# Patient Record
Sex: FEMALE | Race: WHITE | NOT HISPANIC OR LATINO | Employment: OTHER | URBAN - METROPOLITAN AREA
[De-identification: names, ages, dates, MRNs, and addresses within clinical notes are randomized per-mention and may not be internally consistent; named-entity substitution may affect disease eponyms.]

---

## 2021-06-16 ENCOUNTER — TRANSFERRED RECORDS (OUTPATIENT)
Dept: HEALTH INFORMATION MANAGEMENT | Facility: CLINIC | Age: 81
End: 2021-06-16

## 2021-06-17 ENCOUNTER — TRANSFERRED RECORDS (OUTPATIENT)
Dept: HEALTH INFORMATION MANAGEMENT | Facility: CLINIC | Age: 81
End: 2021-06-17

## 2021-06-18 ENCOUNTER — TRANSFERRED RECORDS (OUTPATIENT)
Dept: HEALTH INFORMATION MANAGEMENT | Facility: CLINIC | Age: 81
End: 2021-06-18

## 2021-06-22 ENCOUNTER — MEDICAL CORRESPONDENCE (OUTPATIENT)
Dept: HEALTH INFORMATION MANAGEMENT | Facility: CLINIC | Age: 81
End: 2021-06-22

## 2021-06-25 ENCOUNTER — TRANSFERRED RECORDS (OUTPATIENT)
Dept: HEALTH INFORMATION MANAGEMENT | Facility: CLINIC | Age: 81
End: 2021-06-25

## 2021-06-28 ENCOUNTER — TRANSFERRED RECORDS (OUTPATIENT)
Dept: HEALTH INFORMATION MANAGEMENT | Facility: CLINIC | Age: 81
End: 2021-06-28

## 2021-07-08 ENCOUNTER — TRANSFERRED RECORDS (OUTPATIENT)
Dept: HEALTH INFORMATION MANAGEMENT | Facility: CLINIC | Age: 81
End: 2021-07-08

## 2021-07-14 ENCOUNTER — TRANSFERRED RECORDS (OUTPATIENT)
Dept: HEALTH INFORMATION MANAGEMENT | Facility: CLINIC | Age: 81
End: 2021-07-14

## 2021-07-21 ENCOUNTER — TRANSFERRED RECORDS (OUTPATIENT)
Dept: HEALTH INFORMATION MANAGEMENT | Facility: CLINIC | Age: 81
End: 2021-07-21

## 2021-07-22 ENCOUNTER — TRANSFERRED RECORDS (OUTPATIENT)
Dept: HEALTH INFORMATION MANAGEMENT | Facility: CLINIC | Age: 81
End: 2021-07-22

## 2021-07-26 ENCOUNTER — TRANSCRIBE ORDERS (OUTPATIENT)
Dept: OTHER | Age: 81
End: 2021-07-26

## 2021-07-26 DIAGNOSIS — C34.90 LUNG CANCER (H): Primary | ICD-10-CM

## 2021-07-27 ENCOUNTER — PATIENT OUTREACH (OUTPATIENT)
Dept: ONCOLOGY | Facility: CLINIC | Age: 81
End: 2021-07-27

## 2021-07-27 NOTE — PROGRESS NOTES
I spoke to daughter Danna about mother's referral. She has a biopsy scheduled this Thursday of her lungs. They would like to be seen as soon as possible.    Naida Jeronimo RN

## 2021-07-29 ENCOUNTER — DOCUMENTATION ONLY (OUTPATIENT)
Dept: OTHER | Age: 81
End: 2021-07-29

## 2021-07-29 ENCOUNTER — TRANSFERRED RECORDS (OUTPATIENT)
Dept: HEALTH INFORMATION MANAGEMENT | Facility: CLINIC | Age: 81
End: 2021-07-29
Payer: COMMERCIAL

## 2021-07-29 NOTE — TELEPHONE ENCOUNTER
RECORDS STATUS - ALL OTHER DIAGNOSIS      RECORDS RECEIVED FROM: [facility]   DATE RECEIVED:    NOTES STATUS DETAILS   OFFICE NOTE from referring provider [facility]/Ree 21   OFFICE NOTE from medical oncologist     DISCHARGE SUMMARY from hospital     DISCHARGE REPORT from the ER     OPERATIVE REPORT Epic/Ree Pt has Bx shceduled  @ 9:30 - requested .    21: Colonoscopy  21, 21: EGD   MEDICATION LIST     CLINICAL TRIAL TREATMENTS TO DATE     LABS     PATHOLOGY REPORTS [facility], Report in , Slides received  * 21: Requested  - [employer] Slides RECEIVED 21: [id number]  21: [id number]   ANYTHING RELATED TO DIAGNOSIS [facility] 21   GENONOMIC TESTING     TYPE:     IMAGING (NEED IMAGES & REPORT)     XR PACS 21, 21: Ree/Report in [facility]   CT SCANS PACS 21, 21: Ree/Report in [facility]   MRI     MAMMO PACS 6/10/21: Ree/Report in Epic   ULTRASOUND PACS 21: Ree/Report in [facility]   PET PACS 21: Ree/Report in Epic

## 2021-08-02 ENCOUNTER — ONCOLOGY VISIT (OUTPATIENT)
Dept: ONCOLOGY | Facility: CLINIC | Age: 81
End: 2021-08-02
Attending: STUDENT IN AN ORGANIZED HEALTH CARE EDUCATION/TRAINING PROGRAM
Payer: COMMERCIAL

## 2021-08-02 ENCOUNTER — PRE VISIT (OUTPATIENT)
Dept: ONCOLOGY | Facility: CLINIC | Age: 81
End: 2021-08-02

## 2021-08-02 VITALS
BODY MASS INDEX: 31.95 KG/M2 | RESPIRATION RATE: 16 BRPM | HEART RATE: 77 BPM | HEIGHT: 63 IN | OXYGEN SATURATION: 95 % | TEMPERATURE: 97.9 F | DIASTOLIC BLOOD PRESSURE: 83 MMHG | WEIGHT: 180.3 LBS | SYSTOLIC BLOOD PRESSURE: 137 MMHG

## 2021-08-02 DIAGNOSIS — C34.90 LUNG CANCER (H): ICD-10-CM

## 2021-08-02 PROCEDURE — G0463 HOSPITAL OUTPT CLINIC VISIT: HCPCS

## 2021-08-02 PROCEDURE — 99205 OFFICE O/P NEW HI 60 MIN: CPT | Performed by: STUDENT IN AN ORGANIZED HEALTH CARE EDUCATION/TRAINING PROGRAM

## 2021-08-02 RX ORDER — SIMVASTATIN 20 MG
TABLET ORAL
COMMUNITY

## 2021-08-02 RX ORDER — VERAPAMIL HYDROCHLORIDE 240 MG/1
TABLET, FILM COATED, EXTENDED RELEASE ORAL
COMMUNITY

## 2021-08-02 RX ORDER — SPIRONOLACTONE 50 MG/1
50 TABLET, FILM COATED ORAL
COMMUNITY

## 2021-08-02 ASSESSMENT — PAIN SCALES - GENERAL: PAINLEVEL: NO PAIN (0)

## 2021-08-02 ASSESSMENT — MIFFLIN-ST. JEOR: SCORE: 1249.35

## 2021-08-02 NOTE — PROGRESS NOTES
MEDICAL ONCOLOGY INITIAL CONSULT NOTE    PATIENT NAME: Zuleima Bustamante  ENCOUNTER DATE: 8/2/2021    Care Team  Primary Oncologist: Dr. Hester, [facility]     REASON FOR CURRENT VISIT: 2nd opinion for new diagnosis of lung cancer    HISTORY OF PRESENT ILLNESS:  Ms. Zuleima Bustamante is a 81 year old  female with previous smoking history but quit 42 years ago,    Oncologic Hx:    Diagnosis: Stage (AJCC 8th edition)  Most likely stage IV lung cancer (biopsy results pending), mesothelioma vs NSCLC    Treatment:TBD    Intent of treatment: Palliative/Curative-TBD    Oncologic course:    6/16/2021-6/26/21-admitted at [place], [place] for increasing shortness of breath.  Chest x-ray showed moderate right-sided pleural effusion and airspace consolidation in the right lung base.  A CT abd/pelvis scan showed moderate sided right-sided pleural effusion and several lung nodules abutting the pleura along the medial aspect of right lung suspicious for malignancy.  The largest of the solid enhancing lesions was 2.3 x 1.5 cm.  Another lesion measured 2.4 x 1.4 cm.  There was a number of other nodular enhancing masslike foci along the right pleura including a focus measuring 4.2 x 1.1 cm.  There is also 1.5 cm solid nodule in the right middle lobe.  No other areas of metastasis identified.  Left adrenal nodule suspected.  The patient did have several clustered enhancing solid nodules in the left pelvis just medial to the inferior pubic ramus and lateral to the rectum and vagina.  Although these are nonspecific may be concerning for malignancy.  Mild right right-sided hydronephrosis and mild urothelial enhancement in the proximal right ureter.  She underwent thoracentesis on 6/8/2021 and on 1160 cc of fluid was removed.  The right lung pleural fluid cytology revealed malignant cells (no specific histology identified).  EGD on 6/17/2021 was unremarkable and colonoscopy 6/25 revealed diverticulosis and rectal  polyp, biopsy showed hyperplastic polyp.  No obvious source of bleeding was noted.  7/8/21-PET CT scan done at [facility]-a metabolically active pleural-based mass in the right lower thorax measure 6.7 x 6 x 2.8 cm with an SUV of 14.3.  Additional metabolically active noncalcified pleural nodules are seen scattered throughout the right thorax.  Right pleural effusion.  Metabolically active right paraesophageal lymphadenopathy is likely metastatic.  Liver spleen and adrenal glands are clear of metabolic activity.  A nodule in the left adrenal gland is metabolically inactive and likely benign.  7/14/21- CT head- no evidence of metastatic disease (?MRI)  7/29/21- Lung biopsy at [place]- Biopsy results not yet available    Oncologist at [place]- Dr. Hester-  Oncology nurse- [phone]- Myrna Gresham Hx:    She is here with daughter, Danna who flew in from [place], [place].  Zuleima lives in [place], [place].She lives in [place], 6 months Oct to May for the winter. She lives by her self.    She is currently asymptomatic most of the time. On further questioning she does have some SOB on walking long distances. She walked about 3 blocks today with no SOB. She can take 2 stairs without SOB. She was tired this spring but she was closing the housee in [place]. She just felt tired while doing this.   Over the last 2 weeks, no SOB. She does sleep more lately.   No chest pain, whezing, fever, chills, rigors  No abdominal pain, nausea, vomiting. Appetite is good.  No headaches or vison problems, focal neurological symptoms.    She endorses trouble opening things, she is noticing it more now. She is indepndent of ADL and IADL.  She quit smoking 42 yrs pedro, she smoked total 10 pack yr. 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    REVIEW OF SYSTEMS: 14 point ROS negative other than the symptoms noted above in the  HPI.    Wt Readings from Last 4 Encounters:   08/02/21 81.8 kg (180 lb 4.8 oz)          Review of Systems:  A comprehensive ROS was performed and found to be negative or non-contributory with the exception of that noted in the HPI above.    Past Medical History:  CKD stage III  Hypertension  Hypercholesterolemia  Type 2 diabetes mellitus  Osteoporosis      Past Surgical History:  No past surgical history on file.   Appendectomy  Partial hysterectomy  Right humerus saranya insertion  Left wrist ORIF      Social History:    2 kids, both daughter, Ruthy lives in [facility]  She lives alone and is independent  Social History     Socioeconomic History     Marital status:      Spouse name: Not on file     Number of children: Not on file     Years of education: Not on file     Highest education level: Not on file   Occupational History     Not on file   Tobacco Use     Smoking status: Not on file   Substance and Sexual Activity     Alcohol use: Not on file     Drug use: Not on file     Sexual activity: Not on file   Other Topics Concern     Not on file   Social History Narrative     Not on file     Social Determinants of Health     Financial Resource Strain:      Difficulty of Paying Living Expenses:    Food Insecurity:      Worried About Running Out of Food in the Last Year:      Ran Out of Food in the Last Year:    Transportation Needs:      Lack of Transportation (Medical):      Lack of Transportation (Non-Medical):    Physical Activity:      Days of Exercise per Week:      Minutes of Exercise per Session:    Stress:      Feeling of Stress :    Social Connections:      Frequency of Communication with Friends and Family:      Frequency of Social Gatherings with Friends and Family:      Attends Caoda[employer] Services:      Active Member of Clubs or Organizations:      Attends Club or Organization Meetings:      Marital Status:    Intimate Partner Violence:      Fear of Current or Ex-Partner:      Emotionally Abused:       "Physically Abused:      Sexually Abused:         Family History  No family history on file.  Mother had breast cancer at age 75, lived to 99, and daughter has breast cancer at age of 40, living, stage 4.  She has a sister who is age 70 who does not have any malignancy.  Pateral Uncle - Lung cancer, smoker  Aunt Thalia paternal- Lung cancer, no msoking  Maternal aunt- colon cancer  Aunts daugter- had breast cancer      Outpatient Medications:  metFORMIN (GLUCOPHAGE) 500 MG tablet,   simvastatin (ZOCOR) 20 MG tablet,   spironolactone (ALDACTONE) 50 MG tablet, Take 50 mg by mouth  verapamil ER (CALAN-SR) 240 MG CR tablet,     No current facility-administered medications on file prior to visit.  Alendronate 70 mg 1 tablet by mouth every week  Aspirin 81 mg by mouth daily  Calcium carbonate 500 mg 1 tablet 2-3 times a day  Cholecalciferol vitamin D3 1000 international units by mouth every day  Ferrous sulfate 325 mg 1 tablet by mouth every day  Metformin 500 mg takes half a tablet 1 time daily  Uses CPAP  Naproxen 220 mg takes 2 tablets by mouth 2 times a day as needed for pain  Simvastatin 20 mg, takes half a tablet every day  Spironolactone 50 mg tablet, takes half a tablet every day  Verapamil 250 mg tablet, 1 capsule daily      Physical Exam:    Blood pressure 137/83, pulse 77, temperature 97.9  F (36.6  C), temperature source Oral, resp. rate 16, height 1.604 m (5' 3.15\"), weight 81.8 kg (180 lb 4.8 oz), SpO2 95 %.  General: alert and cooperative, lying in bed, no acute distress  HEENT: sclera anicteric, EOMI, MMM  Neck: supple, normal ROM  CV: RRR, no murmurs  Resp: CTAB, normal respiratory effort on ambient air  GI: soft, non-tender, non-distended, bowel sounds present and normoactive  MSK: warm and well-perfused, normal tone  Skin: no rashes on limited exam, no jaundice  Neuro: Alert and interactive, moves all extremities equally, no focal deficits      ASSESSMENT AND PLAN:    Ms. Zuleima Bustamante is a 81 year old  " female with previous 10 pack yr smoking Hx but quit 42 years ago,HTN. HLD, t2DM now with newly diagnosed pleural lesion, pending biopsy    # Most likely stage IV lung cancer (biopsy results pending), mesothelioma vs NSCLC    I discussed with Zuleima the results of the PET scan which showed multiple pleural plaques largest one in the right cardiophrenic angle.  Due to her history of asbestos exposure, I discussed that this could be consistent with malignant pleural mesothelioma.  Other possibilities include non-small cell lung cancer, small cell lung cancer, or other metastatic disease.  She did have a pleural effusion with cytology showing malignant cells but no histopathologic diagnosis was made outside.  She did have a biopsy procedure done in my note on Thursday, however, the results are still pending.  I discussed that the prognosis and eventually treatment plans will depend on the diagnosis.  I discussed that most likely if this is stage IV cancer, which is not curable but very treatable.  The goals of treatment will be palliative, meaning, delay further treatment progression, improve or maintain quality of life and extend survival as much as he can.  She and her daughter both understood that the prognosis will eventually depend on the cancer diagnosis.  I discussed that once we have a diagnosis, we will again regroup to go over the best treatment options.  However my highest suspicion here is of mesothelioma, and that if this is truly mesothelioma then I will discuss with thoracic surgery group to see if this is resectable.  In my opinion this might not be resectable if this is mesothelioma.  Nonetheless, I briefly discussed that if this is unresectable mesothelioma then immunotherapy with nivolumab and ipilimumab clinically appropriate.  I will follow-up and get the results of the biopsy and follow-up with the patient over my recommendations.  Most likely, the patient will get treatment up in my not but would  like guidance from here.  I also discussed about getting genetic counselling done for family hx of breast cancer. Furthermore, germline BAP1 mutation is relatively common (10-15%) patients with mesothelioma, also predisposes to breast caner as well.    Plan  --We will obtain biopsy results from [place]  --Return to clinic with me on Thursday to go over the results of biopsy and next plans.  --Genetic counseling for ?BAP 1      #Bone health: Has a history of known osteoporosis and takes alendronate every week.    # Cancer related pain: Has no symptoms of cancer related pain    #Nutrition: Doing well no weight loss    #Psychiatry: Coping well    #COVID vaccine: She has finished both shopts, End of Feb, Pfizer      On the day of service,  Preparation time: 10 minutes  Visit time: 45 minutes  Care Coordination: 10 minutes      Fernando Kimble M.D.   of Medicine  Division of Hematology, Oncology and Transplantation  [facility]

## 2021-08-02 NOTE — LETTER
8/2/2021         RE: Zuleima Bustamante  [address] [place] [facility] [address]        Dear Colleague,    Thank you for referring your patient, Zuleima Bustamante, to the [facility]. Please see a copy of my visit note below.    MEDICAL ONCOLOGY INITIAL CONSULT NOTE    PATIENT NAME: Zuleima Bustamante  ENCOUNTER DATE: 8/2/2021    Care Team  Primary Oncologist: Dr. Hester, [facility]     REASON FOR CURRENT VISIT: 2nd opinion for new diagnosis of lung cancer    HISTORY OF PRESENT ILLNESS:  Ms. Zuleima Bustamante is a 81 year old  female with previous smoking history but quit 42 years ago,    Oncologic Hx:    Diagnosis: Stage (AJCC 8th edition)  Most likely stage IV lung cancer (biopsy results pending), mesothelioma vs NSCLC    Treatment:TBD    Intent of treatment: Palliative/Curative-TBD    Oncologic course:    6/16/2021-6/26/21-admitted at [place], [place] for increasing shortness of breath.  Chest x-ray showed moderate right-sided pleural effusion and airspace consolidation in the right lung base.  A CT abd/pelvis scan showed moderate sided right-sided pleural effusion and several lung nodules abutting the pleura along the medial aspect of right lung suspicious for malignancy.  The largest of the solid enhancing lesions was 2.3 x 1.5 cm.  Another lesion measured 2.4 x 1.4 cm.  There was a number of other nodular enhancing masslike foci along the right pleura including a focus measuring 4.2 x 1.1 cm.  There is also 1.5 cm solid nodule in the right middle lobe.  No other areas of metastasis identified.  Left adrenal nodule suspected.  The patient did have several clustered enhancing solid nodules in the left pelvis just medial to the inferior pubic ramus and lateral to the rectum and vagina.  Although these are nonspecific may be concerning for malignancy.  Mild right right-sided hydronephrosis and mild urothelial enhancement in the proximal right ureter.  She underwent  thoracentesis on 6/8/2021 and on 1160 cc of fluid was removed.  The right lung pleural fluid cytology revealed malignant cells (no specific histology identified).  EGD on 6/17/2021 was unremarkable and colonoscopy 6/25 revealed diverticulosis and rectal polyp, biopsy showed hyperplastic polyp.  No obvious source of bleeding was noted.  7/8/21-PET CT scan done at [facility]-a metabolically active pleural-based mass in the right lower thorax measure 6.7 x 6 x 2.8 cm with an SUV of 14.3.  Additional metabolically active noncalcified pleural nodules are seen scattered throughout the right thorax.  Right pleural effusion.  Metabolically active right paraesophageal lymphadenopathy is likely metastatic.  Liver spleen and adrenal glands are clear of metabolic activity.  A nodule in the left adrenal gland is metabolically inactive and likely benign.  7/14/21- CT head- no evidence of metastatic disease (?MRI)  7/29/21- Lung biopsy at [place]- Biopsy results not yet available    Oncologist at [facility]- Dr. Hester-  Oncology nurse- [phone]- Myrna Gresham Hx:    She is here with daughter, Danna who flew in from [place], [place].  Zuleima lives in [place], [place].She lives in [place], 6 months Oct to May for the winter. She lives by her self.    She is currently asymptomatic most of the time. On further questioning she does have some SOB on walking long distances. She walked about 3 blocks today with no SOB. She can take 2 stairs without SOB. She was tired this spring but she was closing the housee in [place]. She just felt tired while doing this.   Over the last 2 weeks, no SOB. She does sleep more lately.   No chest pain, whezing, fever, chills, rigors  No abdominal pain, nausea, vomiting. Appetite is good.  No headaches or vison problems, focal neurological symptoms.    She endorses trouble opening things, she is noticing it more now. She is indepndent of ADL and IADL.  She quit smoking 42 yrs pedro, she smoked total 10 pack yr. 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    REVIEW OF SYSTEMS: 14 point ROS negative other than the symptoms noted above in the HPI.    Wt Readings from Last 4 Encounters:   08/02/21 81.8 kg (180 lb 4.8 oz)          Review of Systems:  A comprehensive ROS was performed and found to be negative or non-contributory with the exception of that noted in the HPI above.    Past Medical History:  CKD stage III  Hypertension  Hypercholesterolemia  Type 2 diabetes mellitus  Osteoporosis      Past Surgical History:  No past surgical history on file.   Appendectomy  Partial hysterectomy  Right humerus saranya insertion  Left wrist ORIF      Social History:    2 kids, both daughter, Ruthy lives in [facility]  She lives alone and is independent  Social History     Socioeconomic History     Marital status:      Spouse name: Not on file     Number of children: Not on file     Years of education: Not on file     Highest education level: Not on file   Occupational History     Not on file   Tobacco Use     Smoking status: Not on file   Substance and Sexual Activity     Alcohol use: Not on file     Drug use: Not on file     Sexual activity: Not on file   Other Topics Concern     Not on file   Social History Narrative     Not on file     Social Determinants of Health     Financial Resource Strain:      Difficulty of Paying Living Expenses:    Food Insecurity:      Worried About Running Out of Food in the Last Year:      Ran Out of Food in the Last Year:    Transportation Needs:      Lack of Transportation (Medical):      Lack of Transportation (Non-Medical):    Physical Activity:      Days of Exercise per Week:      Minutes of Exercise per Session:    Stress:      Feeling of Stress :    Social Connections:      Frequency of Communication with Friends and Family:      Frequency of Social Gatherings with Friends and  "Family:      Attends [employer] Services:      Active Member of Clubs or Organizations:      Attends Club or Organization Meetings:      Marital Status:    Intimate Partner Violence:      Fear of Current or Ex-Partner:      Emotionally Abused:      Physically Abused:      Sexually Abused:         Family History  No family history on file.  Mother had breast cancer at age 75, lived to 99, and daughter has breast cancer at age of 40, living, stage 4.  She has a sister who is age 70 who does not have any malignancy.  Pateral Uncle - Lung cancer, smoker  Aunt Thalia paternal- Lung cancer, no msoking  Maternal aunt- colon cancer  Aunts daugter- had breast cancer      Outpatient Medications:  metFORMIN (GLUCOPHAGE) 500 MG tablet,   simvastatin (ZOCOR) 20 MG tablet,   spironolactone (ALDACTONE) 50 MG tablet, Take 50 mg by mouth  verapamil ER (CALAN-SR) 240 MG CR tablet,     No current facility-administered medications on file prior to visit.  Alendronate 70 mg 1 tablet by mouth every week  Aspirin 81 mg by mouth daily  Calcium carbonate 500 mg 1 tablet 2-3 times a day  Cholecalciferol vitamin D3 1000 international units by mouth every day  Ferrous sulfate 325 mg 1 tablet by mouth every day  Metformin 500 mg takes half a tablet 1 time daily  Uses CPAP  Naproxen 220 mg takes 2 tablets by mouth 2 times a day as needed for pain  Simvastatin 20 mg, takes half a tablet every day  Spironolactone 50 mg tablet, takes half a tablet every day  Verapamil 250 mg tablet, 1 capsule daily      Physical Exam:    Blood pressure 137/83, pulse 77, temperature 97.9  F (36.6  C), temperature source Oral, resp. rate 16, height 1.604 m (5' 3.15\"), weight 81.8 kg (180 lb 4.8 oz), SpO2 95 %.  General: alert and cooperative, lying in bed, no acute distress  HEENT: sclera anicteric, EOMI, MMM  Neck: supple, normal ROM  CV: RRR, no murmurs  Resp: CTAB, normal respiratory effort on ambient air  GI: soft, non-tender, non-distended, bowel sounds present and " normoactive  MSK: warm and well-perfused, normal tone  Skin: no rashes on limited exam, no jaundice  Neuro: Alert and interactive, moves all extremities equally, no focal deficits      ASSESSMENT AND PLAN:    Ms. Zuleima Bustamante is a 81 year old  female with previous 10 pack yr smoking Hx but quit 42 years ago,HTN. HLD, t2DM now with newly diagnosed pleural lesion, pending biopsy    # Most likely stage IV lung cancer (biopsy results pending), mesothelioma vs NSCLC    I discussed with Zuleima the results of the PET scan which showed multiple pleural plaques largest one in the right cardiophrenic angle.  Due to her history of asbestos exposure, I discussed that this could be consistent with malignant pleural mesothelioma.  Other possibilities include non-small cell lung cancer, small cell lung cancer, or other metastatic disease.  She did have a pleural effusion with cytology showing malignant cells but no histopathologic diagnosis was made outside.  She did have a biopsy procedure done in my note on Thursday, however, the results are still pending.  I discussed that the prognosis and eventually treatment plans will depend on the diagnosis.  I discussed that most likely if this is stage IV cancer, which is not curable but very treatable.  The goals of treatment will be palliative, meaning, delay further treatment progression, improve or maintain quality of life and extend survival as much as he can.  She and her daughter both understood that the prognosis will eventually depend on the cancer diagnosis.  I discussed that once we have a diagnosis, we will again regroup to go over the best treatment options.  However my highest suspicion here is of mesothelioma, and that if this is truly mesothelioma then I will discuss with thoracic surgery group to see if this is resectable.  In my opinion this might not be resectable if this is mesothelioma.  Nonetheless, I briefly discussed that if this is unresectable mesothelioma  then immunotherapy with nivolumab and ipilimumab clinically appropriate.  I will follow-up and get the results of the biopsy and follow-up with the patient over my recommendations.  Most likely, the patient will get treatment up in my not but would like guidance from here.  I also discussed about getting genetic counselling done for family hx of breast cancer. Furthermore, germline BAP1 mutation is relatively common (10-15%) patients with mesothelioma, also predisposes to breast caner as well.    Plan  --We will obtain biopsy results from [place]  --Return to clinic with me on Thursday to go over the results of biopsy and next plans.  --Genetic counseling for ?BAP 1      #Bone health: Has a history of known osteoporosis and takes alendronate every week.    # Cancer related pain: Has no symptoms of cancer related pain    #Nutrition: Doing well no weight loss    #Psychiatry: Coping well    #COVID vaccine: She has finished both shopts, End of Feb, Pfizer      On the day of service,  Preparation time: 10 minutes  Visit time: 45 minutes  Care Coordination: 10 minutes      Fernando Kimble M.D.   of Medicine  Division of Hematology, Oncology and Transplantation  [facility]      Again, thank you for allowing me to participate in the care of your patient.        Sincerely,        Fernando Kimble MD

## 2021-08-02 NOTE — NURSING NOTE
"Oncology Rooming Note    August 2, 2021 11:51 AM   Zuleima Bustamante is a 81 year old female who presents for:    Chief Complaint   Patient presents with     Oncology Clinic Visit     Lung Cancer      Initial Vitals: /83 (BP Location: Left arm, Patient Position: Sitting, Cuff Size: Adult Large)   Pulse 77   Temp 97.9  F (36.6  C) (Oral)   Resp 16   Ht 1.604 m (5' 3.15\")   Wt 81.8 kg (180 lb 4.8 oz)   SpO2 95%   BMI 31.79 kg/m   Estimated body mass index is 31.79 kg/m  as calculated from the following:    Height as of this encounter: 1.604 m (5' 3.15\").    Weight as of this encounter: 81.8 kg (180 lb 4.8 oz). Body surface area is 1.91 meters squared.  No Pain (0) Comment: Data Unavailable   No LMP recorded. Patient is postmenopausal.  Allergies reviewed: Yes  Medications reviewed: Yes    Medications: Medication refills not needed today.  Pharmacy name entered into [facility]: [facility] [place], [place] - [id number]    Clinical concerns: Treatment plan        Phuong Saleem LPN              "

## 2021-08-04 ENCOUNTER — PATIENT OUTREACH (OUTPATIENT)
Dept: ONCOLOGY | Facility: CLINIC | Age: 81
End: 2021-08-04

## 2021-08-05 ENCOUNTER — LAB (OUTPATIENT)
Dept: LAB | Facility: CLINIC | Age: 81
End: 2021-08-05
Attending: STUDENT IN AN ORGANIZED HEALTH CARE EDUCATION/TRAINING PROGRAM
Payer: COMMERCIAL

## 2021-08-05 ENCOUNTER — VIRTUAL VISIT (OUTPATIENT)
Dept: ONCOLOGY | Facility: CLINIC | Age: 81
End: 2021-08-05
Attending: STUDENT IN AN ORGANIZED HEALTH CARE EDUCATION/TRAINING PROGRAM
Payer: MEDICARE

## 2021-08-05 DIAGNOSIS — C34.90 LUNG CANCER (H): Primary | ICD-10-CM

## 2021-08-05 DIAGNOSIS — C34.90 LUNG CANCER (H): ICD-10-CM

## 2021-08-05 LAB
PATH REPORT.COMMENTS IMP SPEC: NORMAL
PATH REPORT.FINAL DX SPEC: NORMAL
PATH REPORT.GROSS SPEC: NORMAL
PATH REPORT.MICROSCOPIC SPEC OTHER STN: NORMAL
PATH REPORT.RELEVANT HX SPEC: NORMAL
PATH REPORT.RELEVANT HX SPEC: NORMAL
PATH REPORT.SITE OF ORIGIN SPEC: NORMAL

## 2021-08-05 PROCEDURE — 99214 OFFICE O/P EST MOD 30 MIN: CPT | Mod: 95 | Performed by: STUDENT IN AN ORGANIZED HEALTH CARE EDUCATION/TRAINING PROGRAM

## 2021-08-05 PROCEDURE — 88321 CONSLTJ&REPRT SLD PREP ELSWR: CPT | Performed by: PATHOLOGY

## 2021-08-05 NOTE — PROGRESS NOTES
MEDICAL ONCOLOGY FOLLOW UP NOTE    PATIENT NAME: Zuleima Bustamante  ENCOUNTER DATE: 8/5/2021    Care Team  Primary Oncologist: Dr. Hester, [facility]     REASON FOR CURRENT VISIT: 2nd opinion for new diagnosis of lung cancer    HISTORY OF PRESENT ILLNESS:  Ms. 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  Chest x-ray showed moderate right-sided pleural effusion and airspace consolidation in the right lung base.  A CT abd/pelvis scan showed moderate sided right-sided pleural effusion and several lung nodules abutting the pleura along the medial aspect of right lung suspicious for malignancy.  The largest of the solid enhancing lesions was 2.3 x 1.5 cm.  Another lesion measured 2.4 x 1.4 cm.  There was a number of other nodular enhancing masslike foci along the right pleura including a focus measuring 4.2 x 1.1 cm.  There is also 1.5 cm solid nodule in the right middle lobe.  No other areas of metastasis identified.  Left adrenal nodule suspected.  The patient did have several clustered enhancing solid nodules in the left pelvis just medial to the inferior pubic ramus and lateral to the rectum and vagina.  Although these are nonspecific may be concerning for malignancy.  Mild right right-sided hydronephrosis and mild urothelial enhancement in the proximal right ureter.  She underwent thoracentesis on 6/8/2021 and on 1160 cc of fluid was removed.  The right lung pleural fluid cytology revealed malignant cells (no specific histology identified).  EGD on 6/17/2021 was unremarkable and colonoscopy 6/25 revealed  diverticulosis and rectal polyp, biopsy showed hyperplastic polyp.  No obvious source of bleeding was noted.  7/8/21-PET CT scan done at [facility]-a metabolically active pleural-based mass in the right lower thorax measure 6.7 x 6 x 2.8 cm with an SUV of 14.3.  Additional metabolically active noncalcified pleural nodules are seen scattered throughout the right thorax.  Right pleural effusion.  Metabolically active right paraesophageal lymphadenopathy is likely metastatic.  Liver spleen and adrenal glands are clear of metabolic activity.  A nodule in the left adrenal gland is metabolically inactive and likely benign.  7/14/21- CT head- no evidence of metastatic disease (?MRI)  7/29/21- Lung biopsy at [place]- Biopsy results (consistent with mesothelioma)    Oncologist at [place]- Dr. Hester-  Oncology nurse- [phone]- Myrna Gresham Hx:  She was on the phone call with daughter, Danna who flew in from [place], [place].  Zuleima lives in [place], [place].She lives in [place], 6 months Oct to May for the winter. She lives by her self.    Since our last appt she has noticed slightly worse GEORGES. Otherwise no new symptoms.   She endorses trouble opening things with her hands, she is noticing it more now. She is indepndent of ADL and IADL.  She quit smoking 42 yrs pedro, she smoked total 10 pack yr. 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      REVIEW OF SYSTEMS: 14 point ROS negative other than the symptoms noted above in the HPI.    Wt Readings from Last 4 Encounters:   08/02/21 81.8 kg (180 lb 4.8 oz)          Review of Systems:  A comprehensive ROS was performed and found to be negative or non-contributory with the exception of that noted in the HPI above.    Past Medical History:  CKD stage III  Hypertension  Hypercholesterolemia  Type 2 diabetes mellitus  Osteoporosis      Past Surgical History:  No  past surgical history on file.   Appendectomy  Partial hysterectomy  Right humerus saranya insertion  Left wrist ORIF      Social History:    2 kids, both daughter, Ruthy lives in [facility]  She lives alone and is independent  Social History     Socioeconomic History     Marital status:      Spouse name: Not on file     Number of children: Not on file     Years of education: Not on file     Highest education level: Not on file   Occupational History     Not on file   Tobacco Use     Smoking status: Former Smoker     Quit date:      Years since quittin.6     Smokeless tobacco: Never Used     Tobacco comment: quit in    Substance and Sexual Activity     Alcohol use: Not on file     Drug use: Not on file     Sexual activity: Not on file   Other Topics Concern     Not on file   Social History Narrative     Not on file     Social Determinants of Health     Financial Resource Strain:      Difficulty of Paying Living Expenses:    Food Insecurity:      Worried About Running Out of Food in the Last Year:      Ran Out of Food in the Last Year:    Transportation Needs:      Lack of Transportation (Medical):      Lack of Transportation (Non-Medical):    Physical Activity:      Days of Exercise per Week:      Minutes of Exercise per Session:    Stress:      Feeling of Stress :    Social Connections:      Frequency of Communication with Friends and Family:      Frequency of Social Gatherings with Friends and Family:      Attends Jewish Services:      Active Member of Clubs or Organizations:      Attends Club or Organization Meetings:      Marital Status:    Intimate Partner Violence:      Fear of Current or Ex-Partner:      Emotionally Abused:      Physically Abused:      Sexually Abused:         Family History  No family history on file.  Mother had breast cancer at age 75, lived to 99, and daughter has breast cancer at age of 40, living, stage 4.  She has a sister who is age 70 who does not have any  malignancy.  Pateral Uncle - Lung cancer, smoker  Aunt [place] paternal- Lung cancer, no msoking  Maternal aunt- colon cancer  Aunts daugter- had breast cancer      Outpatient Medications:  metFORMIN (GLUCOPHAGE) 500 MG tablet,   simvastatin (ZOCOR) 20 MG tablet,   spironolactone (ALDACTONE) 50 MG tablet, Take 50 mg by mouth  verapamil ER (CALAN-SR) 240 MG CR tablet,     No current facility-administered medications on file prior to visit.  Alendronate 70 mg 1 tablet by mouth every week  Aspirin 81 mg by mouth daily  Calcium carbonate 500 mg 1 tablet 2-3 times a day  Cholecalciferol vitamin D3 1000 international units by mouth every day  Ferrous sulfate 325 mg 1 tablet by mouth every day  Metformin 500 mg takes half a tablet 1 time daily  Uses CPAP  Naproxen 220 mg takes 2 tablets by mouth 2 times a day as needed for pain  Simvastatin 20 mg, takes half a tablet every day  Spironolactone 50 mg tablet, takes half a tablet every day  Verapamil 250 mg tablet, 1 capsule daily      Physical Exam:    Not able to perform due to telephone visit      ASSESSMENT AND PLAN:    Ms. Zuleima Bustamante is a 81 year old  female with previous 10 pack yr smoking Hx but quit 42 years ago,HTN. 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  #Significant history of asbestos exposure    I was able to see the biopsy results and also discussed it with the pathologist Dr. Parmer at [place].  The results are consistent with mesothelioma.  Most likely epithelioid.  I discussed the results with the patient over the phone.  I then also discussed her case with Dr. Roberson who is one of the thoracic surgeons here.  On review of her versus PET scan.  Per Dr. Roberson, this might be potentially resectable.  She will therefore contact the patient in the next few days to arrange for an appointment to discuss surgical resection is an  "option.  I discussed that another option could be just to watch her very closely with a short-term CT scan in neck 6 weeks to assess the rate of cancer progression. If she refuses surgery for some reason is not able to get surgery, I discussed that immunotherapy would still remain as an option if she wants to start treatment right away.  I also discussed about getting genetic counselling done for family hx of breast cancer. Furthermore, germline BAP1 mutation is relatively common (10-15%) patients with mesothelioma, also predisposes to breast caner as well.    Plan  --Dr. Roberson's team will contact her to evaluate for potential surgical resection  --Genetic counseling for ?BAP 1, later  --Return to clinic with me (8/11)      #Bone health: Has a history of known osteoporosis and takes alendronate every week.    # Cancer related pain: Has no symptoms of cancer related pain    #Nutrition: Doing well no weight loss    #Psychiatry: Coping well    #COVID vaccine: She has finished both [employer], End of Feb, Pfizer      On the day of service,  Preparation time: 5 minutes  Visit time: 20 minutes  Care Coordination: 5 minutes      Fernando Kimble M.D.   of Medicine  Division of Hematology, Oncology and Transplantation  [facility]    Zuleima is a 81 year old who is being evaluated via a billable telephone visit.      What phone number would you like to be contacted at? [phone]  How would you like to obtain your AVS? Mail a copy     I have reviewed and updated the patient's allergies and medication list.    Concerns: none  Refills: none     Vitals - Patient Reported  Weight (Patient Reported): 81.6 kg (180 lb)  Height (Patient Reported): 160 cm (5' 3\")  BMI (Based on Pt Reported Ht/Wt): 31.89  Pain Score: No Pain (0)    Corin Nicole CMA        Phone call duration: 20 minutes  "

## 2021-08-05 NOTE — PROGRESS NOTES
TC to pt to discuss plans for next appt with Dr. Kimble as path results are pending. Writer spoke with pathologist at pt's local hospital lab who stated results should be in by 08/05 in the a.m. Plan made with pt to keep appt with Dr. Kimble as a telephone visit as she will be driving back to [place] starting in the morning. Dr. Kimble provided with an update and contact info for local pathologist, Dr. Mathew, [phone].

## 2021-08-05 NOTE — LETTER
8/5/2021         RE: Zuleima Bustamante  [address] [facility] [address]        Dear Colleague,    Thank you for referring your patient, Zuleima Bustamante, to the [facility]. Please see a copy of my visit note below.    MEDICAL ONCOLOGY FOLLOW UP NOTE    PATIENT NAME: Zuleima Bustamante  ENCOUNTER DATE: 8/5/2021    Care Team  Primary Oncologist: Dr. Hester, [facility]     REASON FOR CURRENT VISIT: 2nd opinion for new diagnosis of lung cancer    HISTORY OF PRESENT ILLNESS:  Ms. 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  Chest x-ray showed moderate right-sided pleural effusion and airspace consolidation in the right lung base.  A CT abd/pelvis scan showed moderate sided right-sided pleural effusion and several lung nodules abutting the pleura along the medial aspect of right lung suspicious for malignancy.  The largest of the solid enhancing lesions was 2.3 x 1.5 cm.  Another lesion measured 2.4 x 1.4 cm.  There was a number of other nodular enhancing masslike foci along the right pleura including a focus measuring 4.2 x 1.1 cm.  There is also 1.5 cm solid nodule in the right middle lobe.  No other areas of metastasis identified.  Left adrenal nodule suspected.  The patient did have several clustered enhancing solid nodules in the left pelvis just medial to the inferior pubic ramus and lateral to the rectum and vagina.  Although these are nonspecific may be concerning for malignancy.  Mild right right-sided hydronephrosis and mild urothelial enhancement in the proximal right ureter.   She underwent thoracentesis on 6/8/2021 and on 1160 cc of fluid was removed.  The right lung pleural fluid cytology revealed malignant cells (no specific histology identified).  EGD on 6/17/2021 was unremarkable and colonoscopy 6/25 revealed diverticulosis and rectal polyp, biopsy showed hyperplastic polyp.  No obvious source of bleeding was noted.  7/8/21-PET CT scan done at [facility]-a metabolically active pleural-based mass in the right lower thorax measure 6.7 x 6 x 2.8 cm with an SUV of 14.3.  Additional metabolically active noncalcified pleural nodules are seen scattered throughout the right thorax.  Right pleural effusion.  Metabolically active right paraesophageal lymphadenopathy is likely metastatic.  Liver spleen and adrenal glands are clear of metabolic activity.  A nodule in the left adrenal gland is metabolically inactive and likely benign.  7/14/21- CT head- no evidence of metastatic disease (?MRI)  7/29/21- Lung biopsy at [place]- Biopsy results (consistent with mesothelioma)    Oncologist at [place]- Dr. Hester-  Oncology nurse- [phone]- Myrna Gresham Hx:  She was on the phone call with daughter, Danna who flew in from [place], [place].  Zuleima lives in [place], [place].She lives in [place], 6 months Oct to May for the winter. She lives by her self.    Since our last appt she has noticed slightly worse GEORGES. Otherwise no new symptoms.   She endorses trouble opening things with her hands, she is noticing it more now. She is indepndent of ADL and IADL.  She quit smoking 42 yrs pedro, she smoked total 10 pack yr. 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      REVIEW OF SYSTEMS: 14 point ROS negative other than the symptoms noted above in the HPI.    Wt Readings from Last 4 Encounters:   08/02/21 81.8 kg (180 lb 4.8 oz)          Review of Systems:  A comprehensive ROS was performed and  found to be negative or non-contributory with the exception of that noted in the HPI above.    Past Medical History:  CKD stage III  Hypertension  Hypercholesterolemia  Type 2 diabetes mellitus  Osteoporosis      Past Surgical History:  No past surgical history on file.   Appendectomy  Partial hysterectomy  Right humerus saranya insertion  Left wrist ORIF      Social History:    2 kids, both daughter, Ruthy lives in [facility]  She lives alone and is independent  Social History     Socioeconomic History     Marital status:      Spouse name: Not on file     Number of children: Not on file     Years of education: Not on file     Highest education level: Not on file   Occupational History     Not on file   Tobacco Use     Smoking status: Former Smoker     Quit date:      Years since quittin.6     Smokeless tobacco: Never Used     Tobacco comment: quit in    Substance and Sexual Activity     Alcohol use: Not on file     Drug use: Not on file     Sexual activity: Not on file   Other Topics Concern     Not on file   Social History Narrative     Not on file     Social Determinants of Health     Financial Resource Strain:      Difficulty of Paying Living Expenses:    Food Insecurity:      Worried About Running Out of Food in the Last Year:      Ran Out of Food in the Last Year:    Transportation Needs:      Lack of Transportation (Medical):      Lack of Transportation (Non-Medical):    Physical Activity:      Days of Exercise per Week:      Minutes of Exercise per Session:    Stress:      Feeling of Stress :    Social Connections:      Frequency of Communication with Friends and Family:      Frequency of Social Gatherings with Friends and Family:      Attends Jehovah's witness Services:      Active Member of Clubs or Organizations:      Attends Club or Organization Meetings:      Marital Status:    Intimate Partner Violence:      Fear of Current or Ex-Partner:      Emotionally Abused:      Physically Abused:       Sexually Abused:         Family History  No family history on file.  Mother had breast cancer at age 75, lived to 99, and daughter has breast cancer at age of 40, living, stage 4.  She has a sister who is age 70 who does not have any malignancy.  Pateral Uncle - Lung cancer, smoker  Aunt [place] paternal- Lung cancer, no msoking  Maternal aunt- colon cancer  Aunts daugter- had breast cancer      Outpatient Medications:  metFORMIN (GLUCOPHAGE) 500 MG tablet,   simvastatin (ZOCOR) 20 MG tablet,   spironolactone (ALDACTONE) 50 MG tablet, Take 50 mg by mouth  verapamil ER (CALAN-SR) 240 MG CR tablet,     No current facility-administered medications on file prior to visit.  Alendronate 70 mg 1 tablet by mouth every week  Aspirin 81 mg by mouth daily  Calcium carbonate 500 mg 1 tablet 2-3 times a day  Cholecalciferol vitamin D3 1000 international units by mouth every day  Ferrous sulfate 325 mg 1 tablet by mouth every day  Metformin 500 mg takes half a tablet 1 time daily  Uses CPAP  Naproxen 220 mg takes 2 tablets by mouth 2 times a day as needed for pain  Simvastatin 20 mg, takes half a tablet every day  Spironolactone 50 mg tablet, takes half a tablet every day  Verapamil 250 mg tablet, 1 capsule daily      Physical Exam:    Not able to perform due to telephone visit      ASSESSMENT AND PLAN:    Ms. Zuleima Bustamante is a 81 year old  female with previous 10 pack yr smoking Hx but quit 42 years ago,HTN. 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  #Significant history of asbestos exposure    I was able to see the biopsy results and also discussed it with the pathologist Dr. Parmer at [place].  The results are consistent with mesothelioma.  Most likely epithelioid.  I discussed the results with the patient over the phone.  I then also discussed her case with Dr. Roberson who is one of the thoracic  "surgeons here.  On review of her versus PET scan.  Per Dr. Roberson, this might be potentially resectable.  She will therefore contact the patient in the next few days to arrange for an appointment to discuss surgical resection is an option.  I discussed that another option could be just to watch her very closely with a short-term CT scan in neck 6 weeks to assess the rate of cancer progression. If she refuses surgery for some reason is not able to get surgery, I discussed that immunotherapy would still remain as an option if she wants to start treatment right away.  I also discussed about getting genetic counselling done for family hx of breast cancer. Furthermore, germline BAP1 mutation is relatively common (10-15%) patients with mesothelioma, also predisposes to breast caner as well.    Plan  --Dr. Roberson's team will contact her to evaluate for potential surgical resection  --Genetic counseling for ?BAP 1, later  --Return to clinic with me (8/11)      #Bone health: Has a history of known osteoporosis and takes alendronate every week.    # Cancer related pain: Has no symptoms of cancer related pain    #Nutrition: Doing well no weight loss    #Psychiatry: Coping well    #COVID vaccine: She has finished both [employer], End of Feb, Pfizer      On the day of service,  Preparation time: 5 minutes  Visit time: 20 minutes  Care Coordination: 5 minutes      Fernando Kimble M.D.   of Medicine  Division of Hematology, Oncology and Transplantation  [facility]    Zuleima is a 81 year old who is being evaluated via a billable telephone visit.      What phone number would you like to be contacted at? [phone]  How would you like to obtain your AVS? Mail a copy     I have reviewed and updated the patient's allergies and medication list.    Concerns: none  Refills: none     Vitals - Patient Reported  Weight (Patient Reported): 81.6 kg (180 lb)  Height (Patient Reported): 160 cm (5' 3\")  BMI (Based on Pt " Reported Ht/Wt): 31.89  Pain Score: No Pain (0)    Corin Nicole CMA        Phone call duration: 20 minutes      Again, thank you for allowing me to participate in the care of your patient.        Sincerely,        Fernando Kimble MD

## 2021-08-06 DIAGNOSIS — C45.9 MESOTHELIOMA (H): Primary | ICD-10-CM

## 2021-08-09 ENCOUNTER — APPOINTMENT (OUTPATIENT)
Dept: LAB | Facility: CLINIC | Age: 81
End: 2021-08-09
Payer: MEDICARE

## 2021-08-09 ENCOUNTER — PATIENT OUTREACH (OUTPATIENT)
Dept: CARE COORDINATION | Facility: CLINIC | Age: 81
End: 2021-08-09

## 2021-08-09 ENCOUNTER — LAB (OUTPATIENT)
Dept: LAB | Facility: CLINIC | Age: 81
End: 2021-08-09
Payer: MEDICARE

## 2021-08-09 DIAGNOSIS — C34.90 LUNG CANCER (H): Primary | ICD-10-CM

## 2021-08-09 LAB
PATH REPORT.COMMENTS IMP SPEC: NORMAL
PATH REPORT.FINAL DX SPEC: NORMAL
PATH REPORT.FINAL DX SPEC: NORMAL
PATH REPORT.GROSS SPEC: NORMAL
PATH REPORT.GROSS SPEC: NORMAL
PATH REPORT.MICROSCOPIC SPEC OTHER STN: NORMAL
PATH REPORT.MICROSCOPIC SPEC OTHER STN: NORMAL
PATH REPORT.RELEVANT HX SPEC: NORMAL
PATH REPORT.SITE OF ORIGIN SPEC: NORMAL
PATH REPORT.SITE OF ORIGIN SPEC: NORMAL

## 2021-08-09 PROCEDURE — 88321 CONSLTJ&REPRT SLD PREP ELSWR: CPT | Performed by: PATHOLOGY

## 2021-08-09 PROCEDURE — 88321 CONSLTJ&REPRT SLD PREP ELSWR: CPT | Mod: XE | Performed by: PATHOLOGY

## 2021-08-10 NOTE — PROGRESS NOTES
Social Work Follow-Up  [facility]    Data/Intervention:  Patient Name:  Zuleima Bustamante  /Age:  1940 (81 year old)    Reason for Follow-Up:  Accommodations/Lodging    Collaborated With:    -Patient    Intervention/Education/Resources Provided:  SW received a phone call from patient who stated that they had been transferred from the Accommodations office. Patient stated that they are interested in receiving information on hotels/motels near the [facility] as they have an upcoming appointment next week. Per patient's request, SW emailed patient a list of hotels/motels near the [facility] as well as lodging within the [place] area.    Assessment/Plan:  SW will remain available as needed.  Previously provided patient/family with writer's contact information and availability.       LESTER Storey,LGSW  Hematology/Oncology Social Worker  Phone:[phone] Pager: [phone]

## 2021-08-11 ENCOUNTER — VIRTUAL VISIT (OUTPATIENT)
Dept: ONCOLOGY | Facility: CLINIC | Age: 81
End: 2021-08-11
Attending: STUDENT IN AN ORGANIZED HEALTH CARE EDUCATION/TRAINING PROGRAM
Payer: COMMERCIAL

## 2021-08-11 DIAGNOSIS — C34.90 LUNG CANCER (H): ICD-10-CM

## 2021-08-11 DIAGNOSIS — C45.0 MESOTHELIOMA (PLEURAL) (H): Primary | ICD-10-CM

## 2021-08-11 PROCEDURE — 99213 OFFICE O/P EST LOW 20 MIN: CPT | Mod: 95 | Performed by: STUDENT IN AN ORGANIZED HEALTH CARE EDUCATION/TRAINING PROGRAM

## 2021-08-11 NOTE — LETTER
8/11/2021         RE: Zuleima Bustamante  [address] [facility] [address]        Dear Colleague,    Thank you for referring your patient, Zuleima Bustamante, to the [facility]. Please see a copy of my visit note below.    MEDICAL ONCOLOGY FOLLOW UP NOTE    PATIENT NAME: Zuleima Bustamante  ENCOUNTER DATE: 8/11/2021    Care Team  Primary Oncologist: Dr. Hester, [facility]     REASON FOR CURRENT VISIT: 2nd opinion for new diagnosis of lung cancer    HISTORY OF PRESENT ILLNESS:  Ms. 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  Chest x-ray showed moderate right-sided pleural effusion and airspace consolidation in the right lung base.  A CT abd/pelvis scan showed moderate sided right-sided pleural effusion and several lung nodules abutting the pleura along the medial aspect of right lung suspicious for malignancy.  The largest of the solid enhancing lesions was 2.3 x 1.5 cm.  Another lesion measured 2.4 x 1.4 cm.  There was a number of other nodular enhancing masslike foci along the right pleura including a focus measuring 4.2 x 1.1 cm.  There is also 1.5 cm solid nodule in the right middle lobe.  No other areas of metastasis identified.  Left adrenal nodule suspected.  The patient did have several clustered enhancing solid nodules in the left pelvis just medial to the inferior pubic ramus and lateral to the rectum and vagina.  Although these are nonspecific may be concerning for malignancy.  Mild right right-sided hydronephrosis and mild urothelial  enhancement in the proximal right ureter.  She underwent thoracentesis on 6/8/2021 and on 1160 cc of fluid was removed.  The right lung pleural fluid cytology revealed malignant cells (no specific histology identified).  EGD on 6/17/2021 was unremarkable and colonoscopy 6/25 revealed diverticulosis and rectal polyp, biopsy showed hyperplastic polyp.  No obvious source of bleeding was noted.  7/8/21-PET CT scan done at [facility]-a metabolically active pleural-based mass in the right lower thorax measure 6.7 x 6 x 2.8 cm with an SUV of 14.3.  Additional metabolically active noncalcified pleural nodules are seen scattered throughout the right thorax.  Right pleural effusion.  Metabolically active right paraesophageal lymphadenopathy is likely metastatic.  Liver spleen and adrenal glands are clear of metabolic activity.  A nodule in the left adrenal gland is metabolically inactive and likely benign.  7/14/21- CT head- no evidence of metastatic disease   7/29/21- Lung biopsy at [place]- Biopsy results (consistent with mesothelioma, epitheloid histology)    Oncologist at [place]- Dr. Hester-  Oncology nurse- [phone]- Myrna Gresham Hx:  She was on the phone call with daughter, Danna who flew in from [place], [place].  Zuleima lives in [place], [place].She lives in [place], 6 months Oct to May for the winter. She lives by her self.    Since our last appt she has stable SOB, and slight pressure in the chest when she wears bra.  Otherwise no new symptoms. She thinks she is getting her strength back.She endorses trouble opening things with her hands, she is noticing it more now. She is indepndent of ADL and IADL.  She quit smoking 42 yrs pedro, she smoked total 10 pack yr. 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    REVIEW OF SYSTEMS: 14 point ROS negative other than the symptoms noted above in the HPI.    Wt  Readings from Last 4 Encounters:   21 81.8 kg (180 lb 4.8 oz)          Review of Systems:  A comprehensive ROS was performed and found to be negative or non-contributory with the exception of that noted in the HPI above.    Past Medical History:  CKD stage III  Hypertension  Hypercholesterolemia  Type 2 diabetes mellitus  Osteoporosis      Past Surgical History:  No past surgical history on file.   Appendectomy  Partial hysterectomy  Right humerus saranya insertion  Left wrist ORIF      Social History:    2 kids, both daughter, Ruthy lives in [facility]  She lives alone and is independent  Social History     Socioeconomic History     Marital status:      Spouse name: Not on file     Number of children: Not on file     Years of education: Not on file     Highest education level: Not on file   Occupational History     Not on file   Tobacco Use     Smoking status: Former Smoker     Quit date:      Years since quittin.6     Smokeless tobacco: Never Used     Tobacco comment: quit in    Substance and Sexual Activity     Alcohol use: Not on file     Drug use: Not on file     Sexual activity: Not on file   Other Topics Concern     Not on file   Social History Narrative     Not on file     Social Determinants of Health     Financial Resource Strain:      Difficulty of Paying Living Expenses:    Food Insecurity:      Worried About Running Out of Food in the Last Year:      Ran Out of Food in the Last Year:    Transportation Needs:      Lack of Transportation (Medical):      Lack of Transportation (Non-Medical):    Physical Activity:      Days of Exercise per Week:      Minutes of Exercise per Session:    Stress:      Feeling of Stress :    Social Connections:      Frequency of Communication with Friends and Family:      Frequency of Social Gatherings with Friends and Family:      Attends [employer] Services:      Active Member of Clubs or Organizations:      Attends Club or Organization Meetings:       Marital Status:    Intimate Partner Violence:      Fear of Current or Ex-Partner:      Emotionally Abused:      Physically Abused:      Sexually Abused:         Family History  No family history on file.  Mother had breast cancer at age 75, lived to 99, and daughter has breast cancer at age of 40, living, stage 4.  She has a sister who is age 70 who does not have any malignancy.  Pateral Uncle - Lung cancer, smoker  Aunt [place] paternal- Lung cancer, no msoking  Maternal aunt- colon cancer  Aunts daugter- had breast cancer      Outpatient Medications:  metFORMIN (GLUCOPHAGE) 500 MG tablet,   simvastatin (ZOCOR) 20 MG tablet,   spironolactone (ALDACTONE) 50 MG tablet, Take 50 mg by mouth  verapamil ER (CALAN-SR) 240 MG CR tablet,     No current facility-administered medications on file prior to visit.  Alendronate 70 mg 1 tablet by mouth every week  Aspirin 81 mg by mouth daily  Calcium carbonate 500 mg 1 tablet 2-3 times a day  Cholecalciferol vitamin D3 1000 international units by mouth every day  Ferrous sulfate 325 mg 1 tablet by mouth every day  Metformin 500 mg takes half a tablet 1 time daily  Uses CPAP  Naproxen 220 mg takes 2 tablets by mouth 2 times a day as needed for pain  Simvastatin 20 mg, takes half a tablet every day  Spironolactone 50 mg tablet, takes half a tablet every day  Verapamil 250 mg tablet, 1 capsule daily      Physical Exam:    Not able to perform due to telephone visit      ASSESSMENT AND PLAN:    Ms. Zuleima Bustamante is a 81 year old  female with previous 10 pack yr smoking Hx but quit 42 years ago,HTN. 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 Roberson.  She thinks that the lesions are potentially  resectable.  She has an appointment with Dr. Roberson to discuss surgical treatment options.  I discussed that her treatment options might include either EPP which is a more radical surgery or pleurectomy decortication which is a lung sparing surgery.  I briefly discussed the data behind this, which are mostly nonrandomized phase 2 studies.  I also briefly discussed the role of neoadjuvant or adjuvant chemotherapy and radiation in this setting.  I discussed that again the data is in the form of nonrandomized phase 2 studies and there is no clear benefit established of trimodality treatment.  I discussed that if and when she gets the surgery, we can then discuss about potential adjuvant treatment options with chemotherapy and radiation at the time.  She is in agreement with the plan.  She then asked several intelligent questions which were answered to her satisfaction.      Plan  --RTC in 2 weeks  --Genetic counseling for ?BAP 1, later      #Bone health: Has a history of known osteoporosis and takes alendronate every week.    # Cancer related pain: Has no symptoms of cancer related pain    #Nutrition: Doing well no weight loss    #Psychiatry: Coping well    #COVID vaccine: She has finished both [employer], End of Feb, Pfizer      On the day of service,  Preparation time: 5 minutes  Visit time: 15 minutes  Care Coordination: 5 minutes      Fernando Kimble M.D.   of Medicine  Division of Hematology, Oncology and Transplantation  [facility]      Zuleima is a 81 year old who is being evaluated via a billable telephone visit.      What phone number would you like to be contacted at? [phone]  How would you like to obtain your AVS? TARUN Barrera    Phone call duration: 15 minutes          Again, thank you for allowing me to participate in the care of your patient.        Sincerely,        Fernando Kimble MD

## 2021-08-11 NOTE — PROGRESS NOTES
MEDICAL ONCOLOGY FOLLOW UP NOTE    PATIENT NAME: Zuleima Bustamante  ENCOUNTER DATE: 8/11/2021    Care Team  Primary Oncologist: Dr. Hester, [facility]     REASON FOR CURRENT VISIT: 2nd opinion for new diagnosis of lung cancer    HISTORY OF PRESENT ILLNESS:  Ms. 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  Chest x-ray showed moderate right-sided pleural effusion and airspace consolidation in the right lung base.  A CT abd/pelvis scan showed moderate sided right-sided pleural effusion and several lung nodules abutting the pleura along the medial aspect of right lung suspicious for malignancy.  The largest of the solid enhancing lesions was 2.3 x 1.5 cm.  Another lesion measured 2.4 x 1.4 cm.  There was a number of other nodular enhancing masslike foci along the right pleura including a focus measuring 4.2 x 1.1 cm.  There is also 1.5 cm solid nodule in the right middle lobe.  No other areas of metastasis identified.  Left adrenal nodule suspected.  The patient did have several clustered enhancing solid nodules in the left pelvis just medial to the inferior pubic ramus and lateral to the rectum and vagina.  Although these are nonspecific may be concerning for malignancy.  Mild right right-sided hydronephrosis and mild urothelial enhancement in the proximal right ureter.  She underwent thoracentesis on 6/8/2021 and on 1160 cc of fluid was removed.  The right lung pleural fluid cytology revealed malignant cells (no specific histology identified).  EGD on 6/17/2021 was  unremarkable and colonoscopy 6/25 revealed diverticulosis and rectal polyp, biopsy showed hyperplastic polyp.  No obvious source of bleeding was noted.  7/8/21-PET CT scan done at [facility]-a metabolically active pleural-based mass in the right lower thorax measure 6.7 x 6 x 2.8 cm with an SUV of 14.3.  Additional metabolically active noncalcified pleural nodules are seen scattered throughout the right thorax.  Right pleural effusion.  Metabolically active right paraesophageal lymphadenopathy is likely metastatic.  Liver spleen and adrenal glands are clear of metabolic activity.  A nodule in the left adrenal gland is metabolically inactive and likely benign.  7/14/21- CT head- no evidence of metastatic disease   7/29/21- Lung biopsy at [place]- Biopsy results (consistent with mesothelioma, epitheloid histology)    Oncologist at [place]- Dr. Hester-  Oncology nurse- [phone]- Myrna Gresham Hx:  She was on the phone call with daughter, Danna who flew in from [place], [place].  Zuleima lives in [place], [place].She lives in [place], 6 months Oct to May for the winter. She lives by her self.    Since our last appt she has stable SOB, and slight pressure in the chest when she wears bra.  Otherwise no new symptoms. She thinks she is getting her strength back.She endorses trouble opening things with her hands, she is noticing it more now. She is indepndent of ADL and IADL.  She quit smoking 42 yrs pedro, she smoked total 10 pack yr. 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    REVIEW OF SYSTEMS: 14 point ROS negative other than the symptoms noted above in the HPI.    Wt Readings from Last 4 Encounters:   08/02/21 81.8 kg (180 lb 4.8 oz)          Review of Systems:  A comprehensive ROS was performed and found to be negative or non-contributory with the exception of that noted in the HPI above.    Past  Medical History:  CKD stage III  Hypertension  Hypercholesterolemia  Type 2 diabetes mellitus  Osteoporosis      Past Surgical History:  No past surgical history on file.   Appendectomy  Partial hysterectomy  Right humerus saranya insertion  Left wrist ORIF      Social History:    2 kids, both daughter, Ruthy lives in [facility]  She lives alone and is independent  Social History     Socioeconomic History     Marital status:      Spouse name: Not on file     Number of children: Not on file     Years of education: Not on file     Highest education level: Not on file   Occupational History     Not on file   Tobacco Use     Smoking status: Former Smoker     Quit date:      Years since quittin.6     Smokeless tobacco: Never Used     Tobacco comment: quit in    Substance and Sexual Activity     Alcohol use: Not on file     Drug use: Not on file     Sexual activity: Not on file   Other Topics Concern     Not on file   Social History Narrative     Not on file     Social Determinants of Health     Financial Resource Strain:      Difficulty of Paying Living Expenses:    Food Insecurity:      Worried About Running Out of Food in the Last Year:      Ran Out of Food in the Last Year:    Transportation Needs:      Lack of Transportation (Medical):      Lack of Transportation (Non-Medical):    Physical Activity:      Days of Exercise per Week:      Minutes of Exercise per Session:    Stress:      Feeling of Stress :    Social Connections:      Frequency of Communication with Friends and Family:      Frequency of Social Gatherings with Friends and Family:      Attends [employer] Services:      Active Member of Clubs or Organizations:      Attends Club or Organization Meetings:      Marital Status:    Intimate Partner Violence:      Fear of Current or Ex-Partner:      Emotionally Abused:      Physically Abused:      Sexually Abused:         Family History  No family history on file.  Mother had breast cancer at age  75, lived to 99, and daughter has breast cancer at age of 40, living, stage 4.  She has a sister who is age 70 who does not have any malignancy.  Pateral Uncle - Lung cancer, smoker  Aunt [place] paternal- Lung cancer, no msoking  Maternal aunt- colon cancer  Aunts daugter- had breast cancer      Outpatient Medications:  metFORMIN (GLUCOPHAGE) 500 MG tablet,   simvastatin (ZOCOR) 20 MG tablet,   spironolactone (ALDACTONE) 50 MG tablet, Take 50 mg by mouth  verapamil ER (CALAN-SR) 240 MG CR tablet,     No current facility-administered medications on file prior to visit.  Alendronate 70 mg 1 tablet by mouth every week  Aspirin 81 mg by mouth daily  Calcium carbonate 500 mg 1 tablet 2-3 times a day  Cholecalciferol vitamin D3 1000 international units by mouth every day  Ferrous sulfate 325 mg 1 tablet by mouth every day  Metformin 500 mg takes half a tablet 1 time daily  Uses CPAP  Naproxen 220 mg takes 2 tablets by mouth 2 times a day as needed for pain  Simvastatin 20 mg, takes half a tablet every day  Spironolactone 50 mg tablet, takes half a tablet every day  Verapamil 250 mg tablet, 1 capsule daily      Physical Exam:    Not able to perform due to telephone visit      ASSESSMENT AND PLAN:    Ms. Zuleima Bustamante is a 81 year old  female with previous 10 pack yr smoking Hx but quit 42 years ago,HTN. 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 Roberson.  She thinks that the lesions are potentially resectable.  She has an appointment with Dr. Roberson to discuss surgical treatment options.  I discussed that her treatment options might include either EPP which is a more radical surgery or pleurectomy decortication which is a lung sparing  surgery.  I briefly discussed the data behind this, which are mostly nonrandomized phase 2 studies.  I also briefly discussed the role of neoadjuvant or adjuvant chemotherapy and radiation in this setting.  I discussed that again the data is in the form of nonrandomized phase 2 studies and there is no clear benefit established of trimodality treatment.  I discussed that if and when she gets the surgery, we can then discuss about potential adjuvant treatment options with chemotherapy and radiation at the time.  She is in agreement with the plan.  She then asked several intelligent questions which were answered to her satisfaction.      Plan  --RTC in 2 weeks  --Genetic counseling for ?BAP 1, later      #Bone health: Has a history of known osteoporosis and takes alendronate every week.    # Cancer related pain: Has no symptoms of cancer related pain    #Nutrition: Doing well no weight loss    #Psychiatry: Coping well    #COVID vaccine: She has finished both shopts, End of Feb, Pfizer      On the day of service,  Preparation time: 5 minutes  Visit time: 15 minutes  Care Coordination: 5 minutes      Fernando Kimble M.D.   of Medicine  Division of Hematology, Oncology and Transplantation  [facility]      Zuleima is a 81 year old who is being evaluated via a billable telephone visit.      What phone number would you like to be contacted at? [phone]  How would you like to obtain your AVS? TARUN Barrera    Phone call duration: 15 minutes

## 2021-08-17 ENCOUNTER — TELEPHONE (OUTPATIENT)
Dept: SURGERY | Facility: CLINIC | Age: 81
End: 2021-08-17

## 2021-08-17 DIAGNOSIS — C45.9 MESOTHELIOMA (H): Primary | ICD-10-CM

## 2021-08-17 NOTE — TELEPHONE ENCOUNTER
Left [facility] for Pt to call back and schedule PFT before 8/19 visit with Dr. Roberson if at all possible via IB request

## 2021-08-18 NOTE — PROGRESS NOTES
THORACIC SURGERY - NEW PATIENT OFFICE VISIT      Dear Dr. Enamorado,    I saw Robby at Dr. Kimble's request in consultation for the evaluation and treatment of Malignant Pleural Mesothelioma.     HPI  Mrs. Zuleima Bustamante is a 81 year old woman with recently diagnosed mesothelioma in June 2021 after she presented to the ED in [place] for increasing shortness of breath on exertion. Initially believed to be age related and activity related, her symptoms worsened noticeably after walking up a one flight of stairs in June. CT Abd/pelvis at that time demonstrated pleural effusion and several lung nodules abutting the medial right lung pleura. Core biopsies were taken on 7/29 with results consistent with Epithelioid Mesothelioma.     Presently patient has been well. She does note stable shortness of breath when exerting herself and some chest pain when wearing clothes. But otherwise is going about her daily life unimpeded. She's able to go on 30-40 min walks and is eating appropriately.  Last thoracentesis was on 8/16 (second one)                               Previsit Tests   6/16/21 CT Abd/Pelvis:   Right pleural lesions: 2.3x1.5cm, 2.4x1.4cm, 4.2x1.1cm  Right Middle Lobe lesion: 1.5cm  Left adrenal nodule, left pelvis nodules.       7/8/21 PET CT: Right lower thorax mass, 6.7 x 6 x 2.8 cm with an SUV of 14.3 with scattered smaller noncalcified pleural nodules hroughout the right thorax. Paraesophageal lymphadenopathy that is metabolically active     7/14/21: CT Head: No acute findings  7/29/21: Lung Biopsy at [place], [place] - Mesothelioma, epithelioid histology  8/19/21: Pulmonary Function Testing: FVC 2.5 103%, FEV1: 90%, DLCO: 13.65, 75%    PMH  Anemia  CKD stage III  Hypertension  Hypercholesterolemia  Type 2 diabetes mellitus  Osteoporosis       PSH  Appendectomy  Partial hysterectomy  Right humerus saranya insertion  Left wrist ORIF    PFamH  Mother - Breast cancer  Daughter - Breast cancer  Pateral Uncle - Lung  "cancer, smoker  Paternal Aunt - Lung cancer, non-smoker  Maternal Aunt - Colon Cancer    PSochH  ETOH:  TOB: Former Smoker, 10 pack year history. Quit 42 years ago.   Asbestos: Significant exposure in childhood, had \"tunnels of astbestos\" she walked through frequently in her Teens.     Allergies: \"Suture material\"    Physical examination  BMI 31.7    From a personal perspective, patient lives alone in [place], [place] though spends 6 months of the year in [place] (Oct-May). She has 2 daughters. One lives in [place], [place] and another that lives in [place], [place]      IMPRESSION   81 year old year-old female with right malignant pleural mesothelioma      PLAN  I spent 45 min on the date of the encounter in chart review, patient visit, review of tests, documentation and/or discussion with other providers about the issues documented above. I reviewed the plan as follows:  We discussed the role of multimodality treatment with mesothelioma. Her disease does seem limited to the right hemithorax.However, I would like furtehr clarification on the pelvic and adrenal nodularity.  We discussed what an extended pleurectomy/decortication would involve. I spoke about the risks and benefits including the potential need for long term rehab if her recovery wasn't smooth.    She is concerned about her age and having a major surgery. While I do share the concern about there age, she seems quite fit and I would be willing to cautiously proceed.  We will do the following and rediscuss    - MRI abdo pelvis  -Nutrititon labs  -PET overread  - New PET scan after MRI review      All questions were answered and Zuleima Bustamante and present family were in agreement with the plan.  I appreciate the opportunity to participate in the care of your patient and will keep you updated.  Sincerely,              "

## 2021-08-19 ENCOUNTER — OFFICE VISIT (OUTPATIENT)
Dept: SURGERY | Facility: CLINIC | Age: 81
End: 2021-08-19
Attending: CLINICAL NURSE SPECIALIST
Payer: MEDICARE

## 2021-08-19 VITALS
RESPIRATION RATE: 16 BRPM | HEART RATE: 69 BPM | TEMPERATURE: 98.4 F | BODY MASS INDEX: 31.95 KG/M2 | DIASTOLIC BLOOD PRESSURE: 76 MMHG | WEIGHT: 181.2 LBS | SYSTOLIC BLOOD PRESSURE: 119 MMHG

## 2021-08-19 DIAGNOSIS — C45.9 MESOTHELIOMA (H): ICD-10-CM

## 2021-08-19 PROCEDURE — 94375 RESPIRATORY FLOW VOLUME LOOP: CPT | Performed by: INTERNAL MEDICINE

## 2021-08-19 PROCEDURE — 94726 PLETHYSMOGRAPHY LUNG VOLUMES: CPT | Performed by: INTERNAL MEDICINE

## 2021-08-19 PROCEDURE — 94729 DIFFUSING CAPACITY: CPT | Performed by: INTERNAL MEDICINE

## 2021-08-19 PROCEDURE — G0463 HOSPITAL OUTPT CLINIC VISIT: HCPCS

## 2021-08-19 PROCEDURE — 99204 OFFICE O/P NEW MOD 45 MIN: CPT | Performed by: STUDENT IN AN ORGANIZED HEALTH CARE EDUCATION/TRAINING PROGRAM

## 2021-08-19 RX ORDER — ACETAMINOPHEN 500 MG
500-1000 TABLET ORAL EVERY 6 HOURS PRN
COMMUNITY

## 2021-08-19 ASSESSMENT — PAIN SCALES - GENERAL: PAINLEVEL: NO PAIN (0)

## 2021-08-19 NOTE — LETTER
8/19/2021         RE: Zuleima Bustamante  [address] [facility] [place] [address]        Dear Colleague,    Thank you for referring your patient, Zuleima Bustamante, to the [facility]. Please see a copy of my visit note below.    THORACIC SURGERY - NEW PATIENT OFFICE VISIT      Dear Dr. Enamorado,    I saw Robby at Dr. Kimble's request in consultation for the evaluation and treatment of Malignant Pleural Mesothelioma.     HPI  Mrs. Zuleima Bustamante is a 81 year old woman with recently diagnosed mesothelioma in June 2021 after she presented to the ED in [place] for increasing shortness of breath on exertion. Initially believed to be age related and activity related, her symptoms worsened noticeably after walking up a one flight of stairs in June. CT Abd/pelvis at that time demonstrated pleural effusion and several lung nodules abutting the medial right lung pleura. Core biopsies were taken on 7/29 with results consistent with Epithelioid Mesothelioma.     Presently patient has been well. She does note stable shortness of breath when exerting herself and some chest pain when wearing clothes. But otherwise is going about her daily life unimpeded. She's able to go on 30-40 min walks and is eating appropriately.  Last thoracentesis was on 8/16 (second one)                               Previsit Tests   6/16/21 CT Abd/Pelvis:   Right pleural lesions: 2.3x1.5cm, 2.4x1.4cm, 4.2x1.1cm  Right Middle Lobe lesion: 1.5cm  Left adrenal nodule, left pelvis nodules.       7/8/21 PET CT: Right lower thorax mass, 6.7 x 6 x 2.8 cm with an SUV of 14.3 with scattered smaller noncalcified pleural nodules hroughout the right thorax. Paraesophageal lymphadenopathy that is metabolically active     7/14/21: CT Head: No acute findings  7/29/21: Lung Biopsy at [place], [place] - Mesothelioma, epithelioid histology  8/19/21: Pulmonary Function Testing: FVC 2.5 103%, FEV1: 90%, DLCO: 13.65, 75%    PMH  Anemia  CKD stage  "III  Hypertension  Hypercholesterolemia  Type 2 diabetes mellitus  Osteoporosis       PSH  Appendectomy  Partial hysterectomy  Right humerus saranya insertion  Left wrist ORIF    PFamH  Mother - Breast cancer  Daughter - Breast cancer  Pateral Uncle - Lung cancer, smoker  Paternal Aunt - Lung cancer, non-smoker  Maternal Aunt - Colon Cancer    PSochH  ETOH:  TOB: Former Smoker, 10 pack year history. Quit 42 years ago.   Asbestos: Significant exposure in childhood, had \"tunnels of astbestos\" she walked through frequently in her Teens.     Allergies: \"Suture material\"    Physical examination  BMI 31.7    From a personal perspective, patient lives alone in [place], [place] though spends 6 months of the year in [place] (Oct-May). She has 2 daughters. One lives in [place], [place] and another that lives in [place], [place]      IMPRESSION   81 year old year-old female with right malignant pleural mesothelioma      PLAN  I spent 45 min on the date of the encounter in chart review, patient visit, review of tests, documentation and/or discussion with other providers about the issues documented above. I reviewed the plan as follows:  We discussed the role of multimodality treatment with mesothelioma. Her disease does seem limited to the right hemithorax.However, I would like furtehr clarification on the pelvic and adrenal nodularity.  We discussed what an extended pleurectomy/decortication would involve. I spoke about the risks and benefits including the potential need for long term rehab if her recovery wasn't smooth.    She is concerned about her age and having a major surgery. While I do share the concern about there age, she seems quite fit and I would be willing to cautiously proceed.  We will do the following and rediscuss    - MRI abdo pelvis  -Nutrititon labs  -PET overread  - New PET scan after MRI review      All questions were answered and Zuleima Bustamante and present family were in agreement with the plan.  I appreciate " the opportunity to participate in the care of your patient and will keep you updated.  Sincerely,                  Again, thank you for allowing me to participate in the care of your patient.        Sincerely,        Camille Roberson MD

## 2021-08-19 NOTE — NURSING NOTE
"Oncology Rooming Note    August 19, 2021 9:19 AM   Zuleima Bustamante is a 81 year old female who presents for:    Chief Complaint   Patient presents with     Oncology Clinic Visit     Mesothelioma      Initial Vitals: /76 (BP Location: Left arm, Patient Position: Sitting, Cuff Size: Adult Regular)   Pulse 69   Temp 98.4  F (36.9  C) (Tympanic)   Resp 16   Wt 82.2 kg (181 lb 3.2 oz)   BMI 31.95 kg/m   Estimated body mass index is 31.95 kg/m  as calculated from the following:    Height as of 8/2/21: 1.604 m (5' 3.15\").    Weight as of this encounter: 82.2 kg (181 lb 3.2 oz). Body surface area is 1.91 meters squared.  No Pain (0) Comment: Data Unavailable   No LMP recorded. Patient is postmenopausal.  Allergies reviewed: Yes  Medications reviewed: Yes    Medications: Medication refills not needed today.  Pharmacy name entered into [employer]:    [place], [place] - [address] [place] [address] [phone]  [id number] - [place], [place] - [address]     Clinical concerns: New concerns: patient had fluid removed recently. PFT completed today. SOB with activity.       Elaina Bertrand LPN August 19, 2021 9:19 AM              "

## 2021-08-20 DIAGNOSIS — R93.5 ABNORMAL ABDOMINAL CT SCAN: ICD-10-CM

## 2021-08-20 DIAGNOSIS — C45.9 MESOTHELIOMA (H): Primary | ICD-10-CM

## 2021-08-22 ENCOUNTER — HEALTH MAINTENANCE LETTER (OUTPATIENT)
Age: 81
End: 2021-08-22

## 2021-08-23 LAB
DLCOUNC-%PRED-PRE: 75 %
DLCOUNC-PRE: 13.65 ML/MIN/MMHG
DLCOUNC-PRED: 18.14 ML/MIN/MMHG
ERV-%PRED-PRE: 90 %
ERV-PRE: 0.24 L
ERV-PRED: 0.27 L
EXPTIME-PRE: 8.72 SEC
FEF2575-%PRED-PRE: 60 %
FEF2575-PRE: 0.9 L/SEC
FEF2575-PRED: 1.47 L/SEC
FEFMAX-%PRED-PRE: 109 %
FEFMAX-PRE: 4.9 L/SEC
FEFMAX-PRED: 4.47 L/SEC
FEV1-%PRED-PRE: 90 %
FEV1-PRE: 1.65 L
FEV1FEV6-PRE: 67 %
FEV1FEV6-PRED: 77 %
FEV1FVC-PRE: 66 %
FEV1FVC-PRED: 77 %
FEV1SVC-PRE: 65 %
FEV1SVC-PRED: 68 %
FIFMAX-PRE: 4.17 L/SEC
FRCPLETH-%PRED-PRE: 96 %
FRCPLETH-PRE: 2.58 L
FRCPLETH-PRED: 2.67 L
FVC-%PRED-PRE: 103 %
FVC-PRE: 2.5 L
FVC-PRED: 2.41 L
IC-%PRED-PRE: 94 %
IC-PRE: 2.29 L
IC-PRED: 2.43 L
RVPLETH-%PRED-PRE: 105 %
RVPLETH-PRE: 2.34 L
RVPLETH-PRED: 2.22 L
TLCPLETH-%PRED-PRE: 101 %
TLCPLETH-PRE: 4.88 L
TLCPLETH-PRED: 4.8 L
VA-%PRED-PRE: 85 %
VA-PRE: 3.78 L
VC-%PRED-PRE: 93 %
VC-PRE: 2.53 L
VC-PRED: 2.7 L

## 2021-08-24 NOTE — PROGRESS NOTES
MEDICAL ONCOLOGY FOLLOW UP NOTE    PATIENT NAME: Zuleima Bustamante  ENCOUNTER DATE: 8/25/2021    Care Team  Primary Oncologist: Dr. Hester, [facility]     REASON FOR CURRENT VISIT: 2nd opinion for new diagnosis of lung cancer    HISTORY OF PRESENT ILLNESS:  Ms. 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  Chest x-ray showed moderate right-sided pleural effusion and airspace consolidation in the right lung base.  A CT abd/pelvis scan showed moderate sided right-sided pleural effusion and several lung nodules abutting the pleura along the medial aspect of right lung suspicious for malignancy.  The largest of the solid enhancing lesions was 2.3 x 1.5 cm.  Another lesion measured 2.4 x 1.4 cm.  There was a number of other nodular enhancing masslike foci along the right pleura including a focus measuring 4.2 x 1.1 cm.  There is also 1.5 cm solid nodule in the right middle lobe.  No other areas of metastasis identified.  Left adrenal nodule suspected.  The patient did have several clustered enhancing solid nodules in the left pelvis just medial to the inferior pubic ramus and lateral to the rectum and vagina.  Although these are nonspecific may be concerning for malignancy.  Mild right right-sided hydronephrosis and mild urothelial enhancement in the proximal right ureter.  She underwent thoracentesis on 6/8/2021 and on 1160 cc of fluid was removed.  The right lung pleural fluid cytology revealed malignant cells (no specific histology identified).  EGD on 6/17/2021 was  unremarkable and colonoscopy 6/25 revealed diverticulosis and rectal polyp, biopsy showed hyperplastic polyp.  No obvious source of bleeding was noted.  7/8/21-PET CT scan done at [facility]-a metabolically active pleural-based mass in the right lower thorax measure 6.7 x 6 x 2.8 cm with an SUV of 14.3.  Additional metabolically active noncalcified pleural nodules are seen scattered throughout the right thorax.  Right pleural effusion.  Metabolically active right paraesophageal lymphadenopathy is likely metastatic.  Liver spleen and adrenal glands are clear of metabolic activity.  A nodule in the left adrenal gland is metabolically inactive and likely benign.  7/14/21- CT head- no evidence of metastatic disease   7/29/21- Lung biopsy at [facility]- Biopsy results (consistent with mesothelioma, epitheloid histology)  8/19/21: Pulmonary Function Testing: FVC 2.5 103%, FEV1: 90%, DLCO: 13.65, 75%  8/19/21- Dr. Roberson- Thoracic Surgery- Considered for Ex. PD, pending MRI of abd [facility]    Oncologist at [facility]- Dr. Hester-  Oncology nurse- [phone]- Myrna    Interval Hx:  Zuleima Bustamante is seen today as telephone visit for follow-up of mesothelioma. She was on the phone with daughter, Danna who flew in from [place], [place]. They met with Dr. Roberson and discussed surgical options. She has decided not to pursue surgery given her age and is interested to learn more about immunotherapy that was discussed previously. She plans to stay with [facility] in [place], [place] at least for the first few cycles and then may decide to travel back to [place] for the rest of the treatment. They are planning to establish with [facility] in [place], [place].     She feels slightly more fatigued since her trip to [place] but overall has been doing well. She has ongoing mild chest pain which is better since her last visit. She has been taking Tylenol which is helping. She denies sob, cough, fever or chills. Has been  eating and drinking well. She is active and walked up 4 flights of stairs yesterday.     She quit smoking 42 yrs pedro, she smoked total 10 pack yr. 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 Zuleima lives in [place], [place]. She lives in [place], 6 months Oct to May for the winter. She lives by her self.    REVIEW OF SYSTEMS: 14 point ROS negative other than the symptoms noted above in the HPI.    Wt Readings from Last 4 Encounters:   21 82.2 kg (181 lb 3.2 oz)   21 81.8 kg (180 lb 4.8 oz)        Review of Systems:  A comprehensive ROS was performed and found to be negative or non-contributory with the exception of that noted in the HPI above.    Past Medical History:  CKD stage III  Hypertension  Hypercholesterolemia  Type 2 diabetes mellitus  Osteoporosis      Past Surgical History:  No past surgical history on file.   Appendectomy  Partial hysterectomy  Right humerus saranya insertion  Left wrist ORIF      Social History:    2 kids, both daughters, Ruthy lives in [facility]  She lives alone and is independent  Social History     Socioeconomic History     Marital status:      Spouse name: Not on file     Number of children: Not on file     Years of education: Not on file     Highest education level: Not on file   Occupational History     Not on file   Tobacco Use     Smoking status: Former Smoker     Quit date:      Years since quittin.6     Smokeless tobacco: Never Used     Tobacco comment: quit in    Substance and Sexual Activity     Alcohol use: Not on file     Drug use: Not on file     Sexual activity: Not on file   Other Topics Concern     Not on file   Social History Narrative     Not on file     Social Determinants of Health     Financial Resource Strain:      Difficulty of Paying Living Expenses:    Food Insecurity:      Worried About Running Out of Food in the Last Year:       Ran Out of Food in the Last Year:    Transportation Needs:      Lack of Transportation (Medical):      Lack of Transportation (Non-Medical):    Physical Activity:      Days of Exercise per Week:      Minutes of Exercise per Session:    Stress:      Feeling of Stress :    Social Connections:      Frequency of Communication with Friends and Family:      Frequency of Social Gatherings with Friends and Family:      Attends [employer] Services:      Active Member of Clubs or Organizations:      Attends Club or Organization Meetings:      Marital Status:    Intimate Partner Violence:      Fear of Current or Ex-Partner:      Emotionally Abused:      Physically Abused:      Sexually Abused:         Family History  No family history on file.  Mother had breast cancer at age 75, lived to 99, and daughter has breast cancer at age of 40, living, stage 4.  She has a sister who is age 70 who does not have any malignancy.  Pateral Uncle - Lung cancer, smoker  Aunt [place] paternal- Lung cancer, no msoking  Maternal aunt- colon cancer  Aunts daugter- had breast cancer      Outpatient Medications:  acetaminophen (TYLENOL) 500 MG tablet, Take 500-1,000 mg by mouth every 6 hours as needed for mild pain  metFORMIN (GLUCOPHAGE) 500 MG tablet,   simvastatin (ZOCOR) 20 MG tablet,   spironolactone (ALDACTONE) 50 MG tablet, Take 50 mg by mouth  verapamil ER (CALAN-SR) 240 MG CR tablet,     No current facility-administered medications on file prior to visit.  Alendronate 70 mg 1 tablet by mouth every week  Aspirin 81 mg by mouth daily  Calcium carbonate 500 mg 1 tablet 2-3 times a day  Cholecalciferol vitamin D3 1000 international units by mouth every day  Ferrous sulfate 325 mg 1 tablet by mouth every day  Metformin 500 mg takes half a tablet 1 time daily  Uses CPAP  Naproxen 220 mg takes 2 tablets by mouth 2 times a day as needed for pain  Simvastatin 20 mg, takes half a tablet every day  Spironolactone 50 mg tablet, takes half a tablet  every day  Verapamil 250 mg tablet, 1 capsule daily      Physical Exam:    Not able to perform due to telephone visit      ASSESSMENT AND PLAN:    Ms. Zuleima Butsamante is a 81 year old  female with previous 10 pack yr smoking Hx but quit 42 years ago,HTN. HLD, t2DM now with newly diagnosed mesothelioma    #Maligant pleural mesothelioma of the rt lung, epitheloid histology  (potentially resectable), invovement of several pleural surfaces, diagnosed 7/2021  #T2N1M0 (Stage II)  #Significant history of asbestos exposure  #ECOG 1    Patient was seen by Dr. Roberson recently and surgical options were discussed however, patient is does not want surgery given age and concern for recovery time that will take time she has left away from family. Opting out of surgery is a reasonable option for her given above. We reviewed again that the cancer is not curable but we can treat it. Goal of treatment would be to delay progression, symptom improvement and extending survival if possible. Immunotherapy may have the same survival benefit as surgery without the post op morbidity.  We discussed Rx options including Immunotherapy per [id number] (Nivo+ipi) and chemotherapy (platinum pemetrxed).  We reviewed the risks and benefits of both approaches, discussed that with epitheloid histology, outcomes of immunotherapy are similar. Reviewed immunotherapy regimen which would consistent of two agents, nivolumab (given every 2 weeks) and ipilimumab (given every 6 weeks) for 2 years. Reasons to stop treatment would be tolerability or if the tumor is not responding. We do not have data at present for benefit beyond two years of treatment. Reviewed side effects of immunotherapy including nausea, fatigue, joint aches, rashes, colitis, pneumonitis, thyroiditis, inflammation of other endocrine glands, pancreatitis. In about 20-30% of patients, we may stop treatment due to side effects. Less than 1% of patients can have severe side effects including  "inflammation of the heart or brain. We reviewed prognosis with mesothelioma which ranges from 12-24 months. They would like to establish with [facility] in [place], [place]. We are happy to provide the referral and are available for any questions. No further follow-up needed in our clinic. We will hold off on MRI abdomen to evaluate adrenal lesions given that she has opted out of surgery.     She is in agreement with the plan.  She then asked several intelligent questions which were answered to her satisfaction.    Plan  --Provide referral to [facility]      #Bone health: Has a history of known osteoporosis and takes alendronate every week.    Patient was seen and plan discussed with attending physician, Dr. Kimble.       The pt was evaluated with resident/fellow and the note was edited to relflect the combined assessment and plan      On the day of service  Chart review: 5 minutes  Visit duration: 30 minutes  Care coordination: 5 minutes    Fernando Kimble M.D.   of Medicine  Division of Hematology, Oncology and Transplantation  [facility]    Tera is a 81 year old who is being evaluated via a billable telephone visit.      What phone number would you like to be contacted at? [phone]  How would you like to obtain your AVS? MyChart     Vitals - Patient Reported  Weight (Patient Reported): 82.1 kg (181 lb)  Height (Patient Reported): 160.4 cm (5' 3.15\")  BMI (Based on Pt Reported Ht/Wt): 31.91  Pain Score: No Pain (0)    Annette MCNEIL    Phone call duration: 30 minutes  "

## 2021-08-25 ENCOUNTER — VIRTUAL VISIT (OUTPATIENT)
Dept: ONCOLOGY | Facility: CLINIC | Age: 81
End: 2021-08-25
Attending: STUDENT IN AN ORGANIZED HEALTH CARE EDUCATION/TRAINING PROGRAM
Payer: COMMERCIAL

## 2021-08-25 DIAGNOSIS — C45.0 MESOTHELIOMA (PLEURAL) (H): ICD-10-CM

## 2021-08-25 PROCEDURE — 999N001193 HC VIDEO/TELEPHONE VISIT; NO CHARGE

## 2021-08-25 PROCEDURE — 99215 OFFICE O/P EST HI 40 MIN: CPT | Mod: 95 | Performed by: STUDENT IN AN ORGANIZED HEALTH CARE EDUCATION/TRAINING PROGRAM

## 2021-08-25 NOTE — LETTER
8/25/2021         RE: Zuleima Bustamante  [address] [facility] [address]        Dear Colleague,    Thank you for referring your patient, Zuleima Bustamante, to the [facility]. Please see a copy of my visit note below.        MEDICAL ONCOLOGY FOLLOW UP NOTE    PATIENT NAME: Zuleima Bustamante  ENCOUNTER DATE: 8/25/2021    Care Team  Primary Oncologist: Dr. Hester, [facility]     REASON FOR CURRENT VISIT: 2nd opinion for new diagnosis of lung cancer    HISTORY OF PRESENT ILLNESS:  Ms. 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  Chest x-ray showed moderate right-sided pleural effusion and airspace consolidation in the right lung base.  A CT abd/pelvis scan showed moderate sided right-sided pleural effusion and several lung nodules abutting the pleura along the medial aspect of right lung suspicious for malignancy.  The largest of the solid enhancing lesions was 2.3 x 1.5 cm.  Another lesion measured 2.4 x 1.4 cm.  There was a number of other nodular enhancing masslike foci along the right pleura including a focus measuring 4.2 x 1.1 cm.  There is also 1.5 cm solid nodule in the right middle lobe.  No other areas of metastasis identified.  Left adrenal nodule suspected.  The patient did have several clustered enhancing solid nodules in the left pelvis just medial to the inferior pubic ramus and lateral to the rectum and vagina.  Although these are nonspecific may be concerning for malignancy.  Mild right right-sided hydronephrosis and mild urothelial  enhancement in the proximal right ureter.  She underwent thoracentesis on 6/8/2021 and on 1160 cc of fluid was removed.  The right lung pleural fluid cytology revealed malignant cells (no specific histology identified).  EGD on 6/17/2021 was unremarkable and colonoscopy 6/25 revealed diverticulosis and rectal polyp, biopsy showed hyperplastic polyp.  No obvious source of bleeding was noted.  7/8/21-PET CT scan done at [facility]-a metabolically active pleural-based mass in the right lower thorax measure 6.7 x 6 x 2.8 cm with an SUV of 14.3.  Additional metabolically active noncalcified pleural nodules are seen scattered throughout the right thorax.  Right pleural effusion.  Metabolically active right paraesophageal lymphadenopathy is likely metastatic.  Liver spleen and adrenal glands are clear of metabolic activity.  A nodule in the left adrenal gland is metabolically inactive and likely benign.  7/14/21- CT head- no evidence of metastatic disease   7/29/21- Lung biopsy at [place]- Biopsy results (consistent with mesothelioma, epitheloid histology)  8/19/21: Pulmonary Function Testing: FVC 2.5 103%, FEV1: 90%, DLCO: 13.65, 75%  8/19/21- Dr. Roberson- Thoracic Surgery- Considered for Ex. PD, pending MRI of abd for some [facility]    Oncologist at [place]- Dr. Hester-  Oncology nurse- [phone]- Myrna Gresham Hx:  Zuleima Bustamante is seen today as telephone visit for follow-up of mesothelioma. She was on the phone with daughter, Danna who flew in from [place], [place]. They met with Dr. Roberson and discussed surgical options. She has decided not to pursue surgery given her age and is interested to learn more about immunotherapy that was discussed previously. She plans to stay with Danna in [place], [place] at least for the first few cycles and then may decide to travel back to [place] for the rest of the treatment. They are planning to establish with [facility] in [place], [place].     She feels slightly  more fatigued since her trip to [place] but overall has been doing well. She has ongoing mild chest pain which is better since her last visit. She has been taking Tylenol which is helping. She denies sob, cough, fever or chills. Has been eating and drinking well. She is active and walked up 4 flights of stairs yesterday.     She quit smoking 42 yrs pedro, she smoked total 10 pack yr. 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 Zuleima lives in [place], [place]. She lives in [place], 6 months Oct to May for the winter. She lives by her self.    REVIEW OF SYSTEMS: 14 point ROS negative other than the symptoms noted above in the HPI.    Wt Readings from Last 4 Encounters:   21 82.2 kg (181 lb 3.2 oz)   21 81.8 kg (180 lb 4.8 oz)        Review of Systems:  A comprehensive ROS was performed and found to be negative or non-contributory with the exception of that noted in the HPI above.    Past Medical History:  CKD stage III  Hypertension  Hypercholesterolemia  Type 2 diabetes mellitus  Osteoporosis      Past Surgical History:  No past surgical history on file.   Appendectomy  Partial hysterectomy  Right humerus saranya insertion  Left wrist ORIF      Social History:    2 kids, both daughters, Ruthy lives in [facility]  She lives alone and is independent  Social History     Socioeconomic History     Marital status:      Spouse name: Not on file     Number of children: Not on file     Years of education: Not on file     Highest education level: Not on file   Occupational History     Not on file   Tobacco Use     Smoking status: Former Smoker     Quit date:      Years since quittin.6     Smokeless tobacco: Never Used     Tobacco comment: quit in    Substance and Sexual Activity     Alcohol use: Not on file     Drug use: Not on file     Sexual activity: Not on file   Other Topics Concern      Not on file   Social History Narrative     Not on file     Social Determinants of Health     Financial Resource Strain:      Difficulty of Paying Living Expenses:    Food Insecurity:      Worried About Running Out of Food in the Last Year:      Ran Out of Food in the Last Year:    Transportation Needs:      Lack of Transportation (Medical):      Lack of Transportation (Non-Medical):    Physical Activity:      Days of Exercise per Week:      Minutes of Exercise per Session:    Stress:      Feeling of Stress :    Social Connections:      Frequency of Communication with Friends and Family:      Frequency of Social Gatherings with Friends and Family:      Attends [employer] Services:      Active Member of Clubs or Organizations:      Attends Club or Organization Meetings:      Marital Status:    Intimate Partner Violence:      Fear of Current or Ex-Partner:      Emotionally Abused:      Physically Abused:      Sexually Abused:         Family History  No family history on file.  Mother had breast cancer at age 75, lived to 99, and daughter has breast cancer at age of 40, living, stage 4.  She has a sister who is age 70 who does not have any malignancy.  Pateral Uncle - Lung cancer, smoker  Aunt [place] paternal- Lung cancer, no msoking  Maternal aunt- colon cancer  Aunts daugter- had breast cancer      Outpatient Medications:  acetaminophen (TYLENOL) 500 MG tablet, Take 500-1,000 mg by mouth every 6 hours as needed for mild pain  metFORMIN (GLUCOPHAGE) 500 MG tablet,   simvastatin (ZOCOR) 20 MG tablet,   spironolactone (ALDACTONE) 50 MG tablet, Take 50 mg by mouth  verapamil ER (CALAN-SR) 240 MG CR tablet,     No current facility-administered medications on file prior to visit.  Alendronate 70 mg 1 tablet by mouth every week  Aspirin 81 mg by mouth daily  Calcium carbonate 500 mg 1 tablet 2-3 times a day  Cholecalciferol vitamin D3 1000 international units by mouth every day  Ferrous sulfate 325 mg 1 tablet by mouth  every day  Metformin 500 mg takes half a tablet 1 time daily  Uses CPAP  Naproxen 220 mg takes 2 tablets by mouth 2 times a day as needed for pain  Simvastatin 20 mg, takes half a tablet every day  Spironolactone 50 mg tablet, takes half a tablet every day  Verapamil 250 mg tablet, 1 capsule daily      Physical Exam:    Not able to perform due to telephone visit      ASSESSMENT AND PLAN:    Ms. Zuleima Bustamante is a 81 year old  female with previous 10 pack yr smoking Hx but quit 42 years ago,HTN. HLD, t2DM now with newly diagnosed mesothelioma    #Maligant pleural mesothelioma of the rt lung, epitheloid histology  (potentially resectable), invovement of several pleural surfaces, diagnosed 7/2021  #T2N1M0 (Stage II)  #Significant history of asbestos exposure  #ECOG 1    Patient was seen by Dr. Roberson recently and surgical options were discussed however, patient is does not want surgery given age and concern for recovery time that will take time she has left away from family. Opting out of surgery is a reasonable option for her given above. We reviewed again that the cancer is not curable but we can treat it. Goal of treatment would be to delay progression, symptom improvement and extending survival if possible. Immunotherapy may have the same survival benefit as surgery without the post op morbidity.  We discussed Rx options including Immunotherapy per [id number] (Nivo+ipi) and chemotherapy (platinum pemetrxed).  We reviewed the risks and benefits of both approaches, discussed that with epitheloid histology, outcomes of immunotherapy are similar. Reviewed immunotherapy regimen which would consistent of two agents, nivolumab (given every 2 weeks) and ipilimumab (given every 6 weeks) for 2 years. Reasons to stop treatment would be tolerability or if the tumor is not responding. We do not have data at present for benefit beyond two years of treatment. Reviewed side effects of immunotherapy including nausea, fatigue, joint  "aches, rashes, colitis, pneumonitis, thyroiditis, inflammation of other endocrine glands, pancreatitis. In about 20-30% of patients, we may stop treatment due to side effects. Less than 1% of patients can have severe side effects including inflammation of the heart or brain. We reviewed prognosis with mesothelioma which ranges from 12-24 months. They would like to establish with [facility] in [place], [place]. We are happy to provide the referral and are available for any questions. No further follow-up needed in our clinic. We will hold off on MRI abdomen to evaluate adrenal lesions given that she has opted out of surgery.     She is in agreement with the plan.  She then asked several intelligent questions which were answered to her satisfaction.    Plan  --Provide referral to [facility]      #Bone health: Has a history of known osteoporosis and takes alendronate every week.    Patient was seen and plan discussed with attending physician, Dr. Kimble.       The pt was evaluated with resident/fellow and the note was edited to relflect the combined assessment and plan      On the day of service  Chart review: 5 minutes  Visit duration: 30 minutes  Care coordination: 5 minutes    Fernando Kimble M.D.   of Medicine  Division of Hematology, Oncology and Transplantation  [facility]    Tera is a 81 year old who is being evaluated via a billable telephone visit.      What phone number would you like to be contacted at? [phone]  How would you like to obtain your AVS? MyChart     Vitals - Patient Reported  Weight (Patient Reported): 82.1 kg (181 lb)  Height (Patient Reported): 160.4 cm (5' 3.15\")  BMI (Based on Pt Reported Ht/Wt): 31.91  Pain Score: No Pain (0)    Annette MCNEIL    Phone call duration: 30 minutes      Again, thank you for allowing me to participate in the care of your patient.        Sincerely,        Fernando Kimble MD    "

## 2021-08-27 ENCOUNTER — HOSPITAL ENCOUNTER (INPATIENT)
Dept: GENERAL RADIOLOGY | Facility: CLINIC | Age: 81
End: 2021-08-27
Attending: CLINICAL NURSE SPECIALIST
Payer: COMMERCIAL

## 2021-08-27 DIAGNOSIS — C45.9 MESOTHELIOMA (H): ICD-10-CM

## 2021-08-27 PROCEDURE — 78815 PET IMAGE W/CT SKULL-THIGH: CPT

## 2021-10-17 ENCOUNTER — HEALTH MAINTENANCE LETTER (OUTPATIENT)
Age: 81
End: 2021-10-17

## 2022-10-03 ENCOUNTER — HEALTH MAINTENANCE LETTER (OUTPATIENT)
Age: 82
End: 2022-10-03

## 2023-10-21 ENCOUNTER — HEALTH MAINTENANCE LETTER (OUTPATIENT)
Age: 83
End: 2023-10-21

## 2024-01-09 ENCOUNTER — APPOINTMENT (OUTPATIENT)
Dept: URBAN - METROPOLITAN AREA CLINIC 211 | Age: 84
Setting detail: DERMATOLOGY
End: 2024-01-09

## 2024-01-09 DIAGNOSIS — L82.1 OTHER SEBORRHEIC KERATOSIS: ICD-10-CM

## 2024-01-09 DIAGNOSIS — L57.0 ACTINIC KERATOSIS: ICD-10-CM

## 2024-01-09 DIAGNOSIS — D485 NEOPLASM OF UNCERTAIN BEHAVIOR OF SKIN: ICD-10-CM

## 2024-01-09 DIAGNOSIS — M67.4 GANGLION: ICD-10-CM

## 2024-01-09 PROBLEM — D48.5 NEOPLASM OF UNCERTAIN BEHAVIOR OF SKIN: Status: ACTIVE | Noted: 2024-01-09

## 2024-01-09 PROBLEM — M67.442 GANGLION, LEFT HAND: Status: ACTIVE | Noted: 2024-01-09

## 2024-01-09 PROCEDURE — 11103 TANGNTL BX SKIN EA SEP/ADDL: CPT

## 2024-01-09 PROCEDURE — OTHER REASSURANCE: OTHER

## 2024-01-09 PROCEDURE — OTHER OTHER: OTHER

## 2024-01-09 PROCEDURE — OTHER COUNSELING: OTHER

## 2024-01-09 PROCEDURE — 99203 OFFICE O/P NEW LOW 30 MIN: CPT | Mod: 25

## 2024-01-09 PROCEDURE — OTHER BIOPSY BY SHAVE METHOD: OTHER

## 2024-01-09 PROCEDURE — OTHER PRESCRIPTION: OTHER

## 2024-01-09 PROCEDURE — OTHER MIPS QUALITY: OTHER

## 2024-01-09 PROCEDURE — OTHER DEFER: OTHER

## 2024-01-09 PROCEDURE — 11102 TANGNTL BX SKIN SINGLE LES: CPT

## 2024-01-09 PROCEDURE — OTHER OBSERVATION: OTHER

## 2024-01-09 RX ORDER — MUPIROCIN 20 MG/G
OINTMENT TOPICAL
Qty: 15 | Refills: 0 | Status: ERX | COMMUNITY
Start: 2024-01-09

## 2024-01-09 ASSESSMENT — LOCATION DETAILED DESCRIPTION DERM
LOCATION DETAILED: LEFT PROXIMAL PRETIBIAL REGION
LOCATION DETAILED: LEFT RADIAL DORSAL HAND
LOCATION DETAILED: RIGHT DISTAL PRETIBIAL REGION
LOCATION DETAILED: INFERIOR THORACIC SPINE
LOCATION DETAILED: LEFT DISTAL PRETIBIAL REGION
LOCATION DETAILED: LEFT VENTRAL LATERAL DISTAL FOREARM

## 2024-01-09 ASSESSMENT — LOCATION SIMPLE DESCRIPTION DERM
LOCATION SIMPLE: LEFT HAND
LOCATION SIMPLE: UPPER BACK
LOCATION SIMPLE: LEFT PRETIBIAL REGION
LOCATION SIMPLE: RIGHT PRETIBIAL REGION
LOCATION SIMPLE: LEFT FOREARM

## 2024-01-09 ASSESSMENT — LOCATION ZONE DERM
LOCATION ZONE: TRUNK
LOCATION ZONE: ARM
LOCATION ZONE: LEG
LOCATION ZONE: HAND

## 2024-01-09 NOTE — PROCEDURE: OTHER
Other (Free Text): Referred to orthopedic surgeon/specialty- Dr. Jacinda Batres
Detail Level: Zone
Render Risk Assessment In Note?: no
Note Text (......Xxx Chief Complaint.): This diagnosis correlates with the

## 2024-01-09 NOTE — HPI: RASH
What Type Of Note Output Would You Prefer (Optional)?: Bullet Format
Is The Patient Presenting As Previously Scheduled?: Yes
How Severe Is Your Rash?: moderate
Is This A New Presentation, Or A Follow-Up?: Rash
How Severe Is Your Rash?: mild
Additional History: History of HSV

## 2024-01-09 NOTE — PROCEDURE: BIOPSY BY SHAVE METHOD
Detail Level: Detailed
Depth Of Biopsy: dermis
Was A Bandage Applied: Yes
Size Of Lesion In Cm: 1.5
X Size Of Lesion In Cm: 0
Anticipated Plan (Based On Presumed Biopsy Results): depending on severity, discussed MOHS possibility
Biopsy Type: H and E
Biopsy Method: Dermablade
Anesthesia Type: 1% lidocaine with epinephrine
Anesthesia Volume In Cc: 0.5
Hemostasis: Drysol
Wound Care: Petrolatum
Dressing: bandage
Destruction After The Procedure: No
Type Of Destruction Used: Curettage
Curettage Text: The wound bed was treated with curettage after the biopsy was performed.
Cryotherapy Text: The wound bed was treated with cryotherapy after the biopsy was performed.
Electrodesiccation Text: The wound bed was treated with electrodesiccation after the biopsy was performed.
Electrodesiccation And Curettage Text: The wound bed was treated with electrodesiccation and curettage after the biopsy was performed.
Silver Nitrate Text: The wound bed was treated with silver nitrate after the biopsy was performed.
Lab: [id number]
Consent: Written consent was obtained and risks were reviewed including but not limited to scarring, infection, bleeding, scabbing, incomplete removal, nerve damage and allergy to anesthesia.
Post-Care Instructions: I reviewed with the patient in detail post-care instructions. Patient is to keep the biopsy site dry overnight, and then apply bacitracin twice daily until healed. Patient may apply hydrogen peroxide soaks to remove any crusting.
Notification Instructions: Patient will be notified of biopsy results. However, patient instructed to call the office if not contacted within 2 weeks.
Billing Type: Third-Party Bill
Information: Selecting Yes will display possible errors in your note based on the variables you have selected. This validation is only offered as a suggestion for you. PLEASE NOTE THAT THE VALIDATION TEXT WILL BE REMOVED WHEN YOU FINALIZE YOUR NOTE. IF YOU WANT TO FAX A PRELIMINARY NOTE YOU WILL NEED TO TOGGLE THIS TO 'NO' IF YOU DO NOT WANT IT IN YOUR FAXED NOTE.
Size Of Lesion In Cm: 0.7

## 2024-01-23 ENCOUNTER — APPOINTMENT (OUTPATIENT)
Dept: URBAN - METROPOLITAN AREA CLINIC 211 | Age: 84
Setting detail: DERMATOLOGY
End: 2024-01-23

## 2024-01-23 DIAGNOSIS — D485 NEOPLASM OF UNCERTAIN BEHAVIOR OF SKIN: ICD-10-CM

## 2024-01-23 DIAGNOSIS — L57.0 ACTINIC KERATOSIS: ICD-10-CM

## 2024-01-23 PROBLEM — D48.5 NEOPLASM OF UNCERTAIN BEHAVIOR OF SKIN: Status: ACTIVE | Noted: 2024-01-23

## 2024-01-23 PROBLEM — C44.722 SQUAMOUS CELL CARCINOMA OF SKIN OF RIGHT LOWER LIMB, INCLUDING HIP: Status: ACTIVE | Noted: 2024-01-23

## 2024-01-23 PROCEDURE — 11102 TANGNTL BX SKIN SINGLE LES: CPT

## 2024-01-23 PROCEDURE — 11103 TANGNTL BX SKIN EA SEP/ADDL: CPT

## 2024-01-23 PROCEDURE — 99213 OFFICE O/P EST LOW 20 MIN: CPT | Mod: 25

## 2024-01-23 PROCEDURE — OTHER TREATMENT REGIMEN: OTHER

## 2024-01-23 PROCEDURE — OTHER BIOPSY BY SHAVE METHOD: OTHER

## 2024-01-23 PROCEDURE — OTHER MIPS QUALITY: OTHER

## 2024-01-23 PROCEDURE — OTHER COUNSELING: OTHER

## 2024-01-23 PROCEDURE — OTHER OBSERVATION: OTHER

## 2024-01-23 ASSESSMENT — LOCATION DETAILED DESCRIPTION DERM
LOCATION DETAILED: LEFT ANTERIOR DISTAL THIGH
LOCATION DETAILED: LEFT LATERAL KNEE
LOCATION DETAILED: LEFT DISTAL CALF
LOCATION DETAILED: LEFT VENTRAL LATERAL DISTAL FOREARM
LOCATION DETAILED: RIGHT DISTAL PRETIBIAL REGION
LOCATION DETAILED: LEFT DISTAL PRETIBIAL REGION

## 2024-01-23 ASSESSMENT — LOCATION ZONE DERM
LOCATION ZONE: ARM
LOCATION ZONE: LEG

## 2024-01-23 ASSESSMENT — LOCATION SIMPLE DESCRIPTION DERM
LOCATION SIMPLE: LEFT THIGH
LOCATION SIMPLE: LEFT KNEE
LOCATION SIMPLE: LEFT FOREARM
LOCATION SIMPLE: RIGHT PRETIBIAL REGION
LOCATION SIMPLE: LEFT PRETIBIAL REGION
LOCATION SIMPLE: LEFT CALF

## 2024-01-23 NOTE — PROCEDURE: BIOPSY BY SHAVE METHOD
Detail Level: Detailed
Depth Of Biopsy: dermis
Was A Bandage Applied: Yes
Size Of Lesion In Cm: 1
X Size Of Lesion In Cm: 0
Biopsy Type: H and E
Biopsy Method: Dermablade
Anesthesia Type: 1% lidocaine with epinephrine
Anesthesia Volume In Cc: 0.5
Hemostasis: Drysol
Wound Care: Petrolatum
Dressing: bandage
Destruction After The Procedure: No
Type Of Destruction Used: Curettage
Curettage Text: The wound bed was treated with curettage after the biopsy was performed.
Cryotherapy Text: The wound bed was treated with cryotherapy after the biopsy was performed.
Electrodesiccation Text: The wound bed was treated with electrodesiccation after the biopsy was performed.
Electrodesiccation And Curettage Text: The wound bed was treated with electrodesiccation and curettage after the biopsy was performed.
Silver Nitrate Text: The wound bed was treated with silver nitrate after the biopsy was performed.
Lab: -[id number]
Consent: Written consent was obtained and risks were reviewed including but not limited to scarring, infection, bleeding, scabbing, incomplete removal, nerve damage and allergy to anesthesia.
Post-Care Instructions: I reviewed with the patient in detail post-care instructions. Patient is to keep the biopsy site dry overnight, and then apply bacitracin twice daily until healed. Patient may apply hydrogen peroxide soaks to remove any crusting.
Notification Instructions: Patient will be notified of biopsy results. However, patient instructed to call the office if not contacted within 2 weeks.
Billing Type: Third-Party Bill
Information: Selecting Yes will display possible errors in your note based on the variables you have selected. This validation is only offered as a suggestion for you. PLEASE NOTE THAT THE VALIDATION TEXT WILL BE REMOVED WHEN YOU FINALIZE YOUR NOTE. IF YOU WANT TO FAX A PRELIMINARY NOTE YOU WILL NEED TO TOGGLE THIS TO 'NO' IF YOU DO NOT WANT IT IN YOUR FAXED NOTE.
Size Of Lesion In Cm: 0.6

## 2024-01-23 NOTE — PROCEDURE: TREATMENT REGIMEN
Plan: Lesions have features of actinic changes but also area slightly psoriasiform. Per patient all areas appeared around 12/2023. Discussed additional sampling to assit in guidance of treatment.
Detail Level: Zone

## 2024-03-14 ENCOUNTER — APPOINTMENT (OUTPATIENT)
Dept: URBAN - METROPOLITAN AREA CLINIC 211 | Age: 84
Setting detail: DERMATOLOGY
End: 2024-03-14

## 2024-03-14 DIAGNOSIS — Z00.00 ENCOUNTER FOR GENERAL ADULT MEDICAL EXAMINATION WITHOUT ABNORMAL FINDINGS: ICD-10-CM

## 2024-03-14 DIAGNOSIS — L82.1 OTHER SEBORRHEIC KERATOSIS: ICD-10-CM

## 2024-03-14 PROCEDURE — OTHER MIPS QUALITY: OTHER

## 2024-03-14 PROCEDURE — OTHER REASSURANCE: OTHER

## 2024-03-14 PROCEDURE — 99212 OFFICE O/P EST SF 10 MIN: CPT

## 2024-03-14 PROCEDURE — OTHER COUNSELING: OTHER

## 2024-03-14 ASSESSMENT — LOCATION DETAILED DESCRIPTION DERM
LOCATION DETAILED: SUPERIOR MID FOREHEAD
LOCATION DETAILED: RIGHT INFERIOR VERMILION LIP

## 2024-03-14 ASSESSMENT — LOCATION ZONE DERM
LOCATION ZONE: LIP
LOCATION ZONE: FACE

## 2024-03-14 ASSESSMENT — LOCATION SIMPLE DESCRIPTION DERM
LOCATION SIMPLE: SUPERIOR FOREHEAD
LOCATION SIMPLE: RIGHT LIP

## 2024-03-14 NOTE — PROCEDURE: REASSURANCE
Additional Note: Offered to treat with LN2; patient declined stating it was not bothersome
Detail Level: Generalized
Include Location In Plan?: No